# Patient Record
Sex: FEMALE | Race: WHITE | Employment: OTHER | URBAN - METROPOLITAN AREA
[De-identification: names, ages, dates, MRNs, and addresses within clinical notes are randomized per-mention and may not be internally consistent; named-entity substitution may affect disease eponyms.]

---

## 2017-11-15 ENCOUNTER — OFFICE VISIT (OUTPATIENT)
Dept: SURGERY | Facility: CLINIC | Age: 72
End: 2017-11-15

## 2017-11-15 VITALS
OXYGEN SATURATION: 98 % | HEIGHT: 59 IN | SYSTOLIC BLOOD PRESSURE: 162 MMHG | WEIGHT: 127.9 LBS | DIASTOLIC BLOOD PRESSURE: 89 MMHG | BODY MASS INDEX: 25.78 KG/M2 | HEART RATE: 93 BPM | TEMPERATURE: 98.2 F

## 2017-11-15 DIAGNOSIS — K62.82 ANAL DYSPLASIA: Primary | ICD-10-CM

## 2017-11-15 ASSESSMENT — PAIN SCALES - GENERAL: PAINLEVEL: NO PAIN (0)

## 2017-11-15 NOTE — LETTER
11/15/2017       RE: Rakel Robert  [address] [address] [address]     Dear Colleague,    Thank you for referring your patient, Rakel Robert, to the [facility] COLON AND RECTAL SURGERY at [facility]. Please see a copy of my visit note below.      Colon and Rectal Surgery Clinic High Resolution Anoscopy Note    RE: Rakel Robert  : 1945  KOSTAS: 11/15/17    Rakel Robert is a 69 year old female with a history of vulvovaginal dysplasia, for which she is no longer having active follow up. She has had prior HRA with biopsies showing low-grade dysplasia. Her last HRA was on 5/20/15 with patchy mild to moderate acetowhitening with normal biopsies and normal anal cytology. She presents today for high resolution anoscopy.     HPI: Rakel has no current complaints.She had basal cell cancer on her nose and ear. She believes she is up to date on her colonoscopy but is unsure when the last was done. She plans to follow up with her PCP soon and will make sure she is not due.    ASSESSMENT: Written, informed consent was obtained prior to procedure. Anal cytology was obtained with Dacron swab. Digital anal rectal exam was performed with two small, movable lesions palpated in the right and left anal canal. Dilute acetic acid soak was completed for 2 minutes. Lubricant was used to insert the anoscope. Performed high resolution microscopy using the colposcope. The dentate line was viewed in its entirety. Abnormal areas noted were diffuse, patchy areas of bright acetowhitening with punctation throughout the anal canal. Two representative biopsies were taken in the left lateral and right lateral anal verge using a baby Tischler forceps after injection with 1.5% lidocaine with epinephrine. Hemostasis was obtained using Monsel solution.  The perianal area was inspected after acetic acid soak. The findings noted hyperpigmentation. No acetowhitening or punctation.    The patient tolerated the procedures well.    PLAN: Rakel Robert is a pleasant 69 year old female with a history of vulvovaginal dysplasia and AIN 1. If biopsies show low grade dysplasia, return for HRA in 6 months. If normal, return in 2 years. If high grade dysplasia, would consider topical Efudex or Aldara given how diffuse it is and would then have her return for HRA after 4 months.  Patient's questions were answered to her stated satisfaction and she is in agreement with this plan.    For details of past medical history, surgical history, family history, medications, allergies, and review of systems, please see details below.    Medical history:  Past Medical History:   Diagnosis Date     Anal dysplasia 2012     Basal cell carcinoma     right elbow     History of cholecystectomy      History of hysterectomy     12-15 years ago     Postmenopausal      Pregnancies, multiple          Sleep apnea        Surgical history:  Past Surgical History:   Procedure Laterality Date     HYSTERECTOMY TOTAL ABDOMINAL, BILATERAL SALPINGO-OOPHORECTOMY, COMBINED      for heavy bleeding     KNEE SURGERY      laparoscopic     LAPAROSCOPIC CHOLECYSTECTOMY       LASER CO2 EXCISE VULVA WIDE LOCAL  2011    Procedure:LASER CO2 EXCISE VULVA WIDE LOCAL; Wide Local Excision of Vulva with CO2 Laser of Vulva , OREIRECTAL BIOPSY, COLPOSCOPY. *Latex Allergy*; Surgeon:ZAHRA CANCINO; Location: OR       Family history:  Family History   Problem Relation Age of Onset     Asthma Sister      Breast Cancer Mother 60     CEREBROVASCULAR DISEASE Father      C.A.D. Mother      C.A.D. Father      DIABETES Mother      DIABETES Son      CANCER Son      testicular     Anesthesia Reaction No family hx of      Colon Polyps No family hx of      Crohn Disease No family hx of      Ulcerative Colitis No family hx of      Colon Cancer No family hx of      Other Cancer       Yes       Medications:  Current Outpatient  "Prescriptions   Medication Sig Dispense Refill     estradiol (ESTRACE) 0.5 MG tablet Take 0.5 mg by mouth daily.       mometasone (NASONEX) 50 MCG/ACT nasal spray 2 sprays by Both Nostrils route daily as needed.       Allergies:  The patientis allergic to adhesive tape; iodine-131; latex; shellfish allergy; sulfa drugs; and mold.    Social history:  Social History   Substance Use Topics     Smoking status: Never Smoker     Smokeless tobacco: Never Used     Alcohol use No     Marital status: .    Review of Systems:  Nursing Notes:   Eyal Arias LPN  11/15/2017  2:24 PM  Signed  Chief Complaint   Patient presents with     Clinic Care Coordination - Follow-up     Pt here today for HRA procedure.        Vitals:    11/15/17 1421   BP: 162/89   BP Location: Left arm   Patient Position: Chair   Cuff Size: Adult Regular   Pulse: 93   Temp: 98.2  F (36.8  C)   TempSrc: Oral   SpO2: 98%   Weight: 127 lb 14.4 oz   Height: 4' 11\"       Body mass index is 25.83 kg/(m^2).  Hollie POP LPN       This procedure was performed under a collaborative agreement with Dr. Rober Barbosa MD, Chief of Colon and Rectal Surgery, [facility].      Again, thank you for allowing me to participate in the care of your patient.      Sincerely,    VIC Leslie CNP      "

## 2017-11-15 NOTE — MR AVS SNAPSHOT
"              After Visit Summary   11/15/2017    Rakel Robert    MRN: [id number]           Patient Information     Date Of Birth          1945        Visit Information        Provider Department      11/15/2017 2:00 PM Tamika Taylor APRN [facility] Colon and Rectal Surgery        Today's Diagnoses     Anal dysplasia    -  1       Follow-ups after your visit        Who to contact     Please call your clinic at [phone] to:    Ask questions about your health    Make or cancel appointments    Discuss your medicines    Learn about your test results    Speak to your doctor   If you have compliments or concerns about an experience at your clinic, or if you wish to file a complaint, please contact [facility] Patient Relations at [phone] or email us at [email or web address]         Additional Information About Your Visit        MyChart Information     [employer] is an electronic gateway that provides easy, online access to your medical records. With [employer], you can request a clinic appointment, read your test results, renew a prescription or communicate with your care team.     To sign up for [employer] visit the website at [email or web address]   You will be asked to enter the access code listed below, as well as some personal information. Please follow the directions to create your username and password.     Your access code is: [email or web address]  Expires: 2018  5:30 AM     Your access code will  in 90 days. If you need help or a new code, please contact your [facility] or call [phone] for assistance.        Care EveryWhere ID     This is your Care EveryWhere ID. This could be used by other organizations to access your [place] medical records  [id number]        Your Vitals Were     Pulse Temperature Height Pulse Oximetry BMI (Body Mass Index)       93 98.2  F (36.8  C) (Oral) 4' 11\" 98% 25.83 kg/m2  "       Blood Pressure from Last 3 Encounters:   11/15/17 162/89   05/20/15 143/80   03/31/14 150/72    Weight from Last 3 Encounters:   11/15/17 127 lb 14.4 oz   05/20/15 121 lb 12.8 oz   03/31/14 121 lb 6.4 oz              We Performed the Following     Cytology non gyn (Anal PAP)     HC ANOSCOPY DX, HRA W/ BIOPSY(IES)     Surgical pathology exam        Primary Care Provider Office Phone # Fax #    Niki BELL [facility] [phone]       [facility] [address]   [facility] [address]        Equal Access to Services     CORNELIA LÓPEZ : Hadii nathan Barajas, wadanielda priti, remi kaalmada jamaal, radha scott. So [facility] [phone].    ATENCIÓN: Si habla español, tiene a joseph disposición servicios gratuitos de asistencia lingüística. [facility] [phone].    We comply with applicable federal civil rights laws and [place] laws. We do not discriminate on the basis of race, color, national origin, age, disability, sex, sexual orientation, or gender identity.            Thank you!     Thank you for choosing [facility] COLON AND RECTAL SURGERY  for your care. Our goal is always to provide you with excellent care. Hearing back from our patients is one way we can continue to improve our services. Please take a few minutes to complete the written survey that you may receive in the mail after your visit with us. Thank you!             Your Updated Medication List - Protect others around you: Learn how to safely use, store and throw away your medicines at www.disposemymeds.org.          This list is accurate as of: 11/15/17  3:00 PM.  Always use your most recent med list.                   Brand Name Dispense Instructions for use Diagnosis    estradiol 0.5 MG tablet    ESTRACE     Take 0.5 mg by mouth daily.        NASONEX 50 MCG/ACT spray   Generic drug:  mometasone      2 sprays by Both Nostrils route daily as needed.

## 2017-11-15 NOTE — PROGRESS NOTES
Colon and Rectal Surgery Clinic High Resolution Anoscopy Note    RE: Rakel Robert  : 1945  KOSTAS: 11/15/17    Rakel Robert is a 69 year old female with a history of vulvovaginal dysplasia, for which she is no longer having active follow up. She has had prior HRA with biopsies showing low-grade dysplasia. Her last HRA was on 5/20/15 with patchy mild to moderate acetowhitening with normal biopsies and normal anal cytology. She presents today for high resolution anoscopy.     HPI: Rakel has no current complaints.She had basal cell cancer on her nose and ear. She believes she is up to date on her colonoscopy but is unsure when the last was done. She plans to follow up with her PCP soon and will make sure she is not due.    ASSESSMENT: Written, informed consent was obtained prior to procedure. Anal cytology was obtained with Dacron swab. Digital anal rectal exam was performed with two small, movable lesions palpated in the right and left anal canal. Dilute acetic acid soak was completed for 2 minutes. Lubricant was used to insert the anoscope. Performed high resolution microscopy using the colposcope. The dentate line was viewed in its entirety. Abnormal areas noted were diffuse, patchy areas of bright acetowhitening with punctation throughout the anal canal. Two representative biopsies were taken in the left lateral and right lateral anal verge using a baby Tischler forceps after injection with 1.5% lidocaine with epinephrine. Hemostasis was obtained using Monsel solution.  The perianal area was inspected after acetic acid soak. The findings noted hyperpigmentation. No acetowhitening or punctation.   The patient tolerated the procedures well.    PLAN: Rakel Robert is a pleasant 69 year old female with a history of vulvovaginal dysplasia and AIN 1. If biopsies show low grade dysplasia, return for HRA in 6 months. If normal, return in 2 years. If high grade dysplasia, would consider topical  Efudex or Aldara given how diffuse it is and would then have her return for HRA after 4 months.  Patient's questions were answered to her stated satisfaction and she is in agreement with this plan.    For details of past medical history, surgical history, family history, medications, allergies, and review of systems, please see details below.    Medical history:  Past Medical History:   Diagnosis Date     Anal dysplasia 2012     Basal cell carcinoma     right elbow     History of cholecystectomy 2006     History of hysterectomy     12-15 years ago     Postmenopausal      Pregnancies, multiple          Sleep apnea        Surgical history:  Past Surgical History:   Procedure Laterality Date     HYSTERECTOMY TOTAL ABDOMINAL, BILATERAL SALPINGO-OOPHORECTOMY, COMBINED      for heavy bleeding     KNEE SURGERY      laparoscopic     LAPAROSCOPIC CHOLECYSTECTOMY       LASER CO2 EXCISE VULVA WIDE LOCAL  2011    Procedure:LASER CO2 EXCISE VULVA WIDE LOCAL; Wide Local Excision of Vulva with CO2 Laser of Vulva , OREIRECTAL BIOPSY, COLPOSCOPY. *Latex Allergy*; Surgeon:ZAHRA CANCINO; Location: OR       Family history:  Family History   Problem Relation Age of Onset     Asthma Sister      Breast Cancer Mother 60     CEREBROVASCULAR DISEASE Father      C.A.D. Mother      C.A.D. Father      DIABETES Mother      DIABETES Son      CANCER Son      testicular     Anesthesia Reaction No family hx of      Colon Polyps No family hx of      Crohn Disease No family hx of      Ulcerative Colitis No family hx of      Colon Cancer No family hx of      Other Cancer       Yes       Medications:  Current Outpatient Prescriptions   Medication Sig Dispense Refill     estradiol (ESTRACE) 0.5 MG tablet Take 0.5 mg by mouth daily.       mometasone (NASONEX) 50 MCG/ACT nasal spray 2 sprays by Both Nostrils route daily as needed.       Allergies:  The patientis allergic to adhesive tape; iodine-131; latex; shellfish allergy;  "sulfa drugs; and mold.    Social history:  Social History   Substance Use Topics     Smoking status: Never Smoker     Smokeless tobacco: Never Used     Alcohol use No     Marital status: .    Review of Systems:  Nursing Notes:   Eyal Arias LPN  11/15/2017  2:24 PM  Signed  Chief Complaint   Patient presents with     Clinic Care Coordination - Follow-up     Pt here today for HRA procedure.        Vitals:    11/15/17 1421   BP: 162/89   BP Location: Left arm   Patient Position: Chair   Cuff Size: Adult Regular   Pulse: 93   Temp: 98.2  F (36.8  C)   TempSrc: Oral   SpO2: 98%   Weight: 127 lb 14.4 oz   Height: 4' 11\"       Body mass index is 25.83 kg/(m^2).  Hollie POP LPN                           This procedure was performed under a collaborative agreement with Dr. Rober Barbosa MD, Chief of Colon and Rectal Surgery, [facility].    Tamika Schmidt, NP-C  Colon and Rectal Surgery  [facility]        "

## 2017-11-15 NOTE — NURSING NOTE
"Chief Complaint   Patient presents with     Clinic Care Coordination - Follow-up     Pt here today for HRA procedure.        Vitals:    11/15/17 1421   BP: 162/89   BP Location: Left arm   Patient Position: Chair   Cuff Size: Adult Regular   Pulse: 93   Temp: 98.2  F (36.8  C)   TempSrc: Oral   SpO2: 98%   Weight: 127 lb 14.4 oz   Height: 4' 11\"       Body mass index is 25.83 kg/(m^2).  Hollie POP LPN                        "

## 2017-11-16 ENCOUNTER — TELEPHONE (OUTPATIENT)
Dept: SURGERY | Facility: CLINIC | Age: 72
End: 2017-11-16

## 2017-11-16 LAB — COPATH REPORT: NORMAL

## 2017-11-16 NOTE — TELEPHONE ENCOUNTER
Called to discuss biopsy results showing low-grade dysplasia. Would recommend repeat high-resolution anoscopy in clinic in 6 months. Left a message.    VIC Miles, NP-C  Colon and Rectal Surgery  [facility]

## 2017-11-17 LAB — COPATH REPORT: NORMAL

## 2018-05-17 ENCOUNTER — TELEPHONE (OUTPATIENT)
Dept: SURGERY | Facility: CLINIC | Age: 73
End: 2018-05-17

## 2018-05-17 NOTE — TELEPHONE ENCOUNTER
Left VM for pt to return my call to schedule follow-up (anal microscopy) appt w/ Tamika Schmidt (Colorectal).  Brianna [phone]

## 2018-06-27 ENCOUNTER — TELEPHONE (OUTPATIENT)
Dept: SURGERY | Facility: CLINIC | Age: 73
End: 2018-06-27

## 2018-06-28 ENCOUNTER — OFFICE VISIT (OUTPATIENT)
Dept: SURGERY | Facility: CLINIC | Age: 73
End: 2018-06-28
Payer: COMMERCIAL

## 2018-06-28 VITALS
WEIGHT: 128.6 LBS | BODY MASS INDEX: 25.92 KG/M2 | OXYGEN SATURATION: 99 % | DIASTOLIC BLOOD PRESSURE: 76 MMHG | TEMPERATURE: 98.5 F | HEIGHT: 59 IN | HEART RATE: 85 BPM | SYSTOLIC BLOOD PRESSURE: 158 MMHG

## 2018-06-28 DIAGNOSIS — K62.82 ANAL DYSPLASIA: Primary | ICD-10-CM

## 2018-06-28 ASSESSMENT — PAIN SCALES - GENERAL: PAINLEVEL: NO PAIN (0)

## 2018-06-28 NOTE — LETTER
2018       RE: Rakel Robert  [address] [address] [facility] [address]     Dear Colleague,    Thank you for referring your patient, Rakel Robert, to the [facility] COLON AND RECTAL SURGERY at [facility]. Please see a copy of my visit note below.      Colon and Rectal Surgery Clinic High Resolution Anoscopy Note    RE: Rakel Robert  : 1945  KOSTAS: 2018    Rakel oRbert is a 69 year old female with a history of vulvovaginal dysplasia, for which she is no longer having active follow up. She has had prior HRA with biopsies showing low-grade dysplasia. Her last HRA was on 11/15/17 with normal anal cytology and biopsies showing low grade dysplasia. She presents today for high resolution anoscopy.     HPI: Rakel has no current complaints.She had basal cell cancer on her nose and ear and has had several laser procedures for this recently. She states that she is up to date on her colonoscopy.    ASSESSMENT:   Prior to the start of the procedure and with procedural staff participation, I verbally confirmed the patient s identity using two indicators, relevant allergies, that the procedure was appropriate and matched the consent or emergent situation, and that the correct equipment/implants were available. Immediately prior to starting the procedure I conducted the Time Out with the procedural staff and re-confirmed the patient s name, procedure, and site/side. (The Joint Commission universal protocol was followed.)  Yes    Sedation (Moderate or Deep): None    Anal cytology was not obtained today. Digital anal rectal exam was performed with two small, movable lesions palpated in the right and left anal canal. Dilute acetic acid soak was completed for 2 minutes. Lubricant was used to insert the anoscope. Performed high resolution microscopy using the colposcope. The dentate line was viewed in its entirety. Abnormal areas noted were diffuse, patchy areas  of bright acetowhitening with punctation throughout the anal canal. One representative biopsy was taken in the right posterior position using a baby Tischler forceps after injection with 1% lidocaine with epinephrine. Hemostasis was obtained using Monsel solution.  The perianal area was inspected after acetic acid soak. Some continued areas of acetowhitening without punctation extending to the right and left perianal position. No biopsies taken.   The patient tolerated the procedures well.    PLAN: Will follow up with biopsy results. If biopsies show low grade dysplasia or are normal, return for HRA in 6 months.  If high grade dysplasia, would advised examination under anesthesia with repeat high resolution anoscopy with fulguration.  Patient's questions were answered to her stated satisfaction and she is in agreement with this plan.    For details of past medical history, surgical history, family history, medications, allergies, and review of systems, please see details below.    Medical history:  Past Medical History:   Diagnosis Date     Anal dysplasia 2012     Basal cell carcinoma     right elbow     History of cholecystectomy 2006     History of hysterectomy     12-15 years ago     Postmenopausal      Pregnancies, multiple          Sleep apnea        Surgical history:  Past Surgical History:   Procedure Laterality Date     HYSTERECTOMY TOTAL ABDOMINAL, BILATERAL SALPINGO-OOPHORECTOMY, COMBINED      for heavy bleeding     KNEE SURGERY      laparoscopic     LAPAROSCOPIC CHOLECYSTECTOMY       LASER CO2 EXCISE VULVA WIDE LOCAL  2011    Procedure:LASER CO2 EXCISE VULVA WIDE LOCAL; Wide Local Excision of Vulva with CO2 Laser of Vulva , OREIRECTAL BIOPSY, COLPOSCOPY. *Latex Allergy*; Surgeon:ZAHRA CANCINO; Location: OR       Family history:  Family History   Problem Relation Age of Onset     Asthma Sister      Breast Cancer Mother 60     Cerebrovascular Disease Father      C.A.D. Mother       DMITRIY Father      Diabetes Mother      Diabetes Son      Cancer Son      testicular     Anesthesia Reaction No family hx of      Colon Polyps No family hx of      Crohn Disease No family hx of      Ulcerative Colitis No family hx of      Colon Cancer No family hx of      Other Cancer       Yes       Medications:  Current Outpatient Prescriptions   Medication Sig Dispense Refill     estradiol (ESTRACE) 0.5 MG tablet Take 0.5 mg by mouth daily.       mometasone (NASONEX) 50 MCG/ACT nasal spray 2 sprays by Both Nostrils route daily as needed.       Allergies:  The patientis allergic to adhesive tape; iodine-131; latex; shellfish allergy; sulfa drugs; and mold.    Social history:  Social History   Substance Use Topics     Smoking status: Never Smoker     Smokeless tobacco: Never Used     Alcohol use No     Marital status: .    Review of Systems:  There are no exam notes on file for this visit.     This procedure was performed under a collaborative agreement with Dr. Rober Barbosa MD, Chief of Colon and Rectal Surgery, [facility].    Tamika Schmidt NP-C  Colon and Rectal Surgery  [facility]

## 2018-06-28 NOTE — NURSING NOTE
The following medication was given:     MEDICATION:  Lidocaine with epinephrine  ROUTE: SQ  SITE: Right Posterior anal canal  DOSE: 1% epi per 30 mL  LOT #: -EV  : Jesse  EXPIRATION DATE: 7/1/2019  NDC#: 3674-7787-21   Was there drug waste? Yes  Amount of drug waste (mL): 25.  Reason for waste:  Single use vial      MICHELLE Estrella  June 28, 2018

## 2018-06-28 NOTE — PROGRESS NOTES
Colon and Rectal Surgery Clinic High Resolution Anoscopy Note    RE: Rakel Robert  : 1945  KOSTAS: 2018    Rakel Robert is a 69 year old female with a history of vulvovaginal dysplasia, for which she is no longer having active follow up. She has had prior HRA with biopsies showing low-grade dysplasia. Her last HRA was on 11/15/17 with normal anal cytology and biopsies showing low grade dysplasia. She presents today for high resolution anoscopy.     HPI: Rakel has no current complaints.She had basal cell cancer on her nose and ear and has had several laser procedures for this recently. She states that she is up to date on her colonoscopy.    ASSESSMENT:   Prior to the start of the procedure and with procedural staff participation, I verbally confirmed the patient s identity using two indicators, relevant allergies, that the procedure was appropriate and matched the consent or emergent situation, and that the correct equipment/implants were available. Immediately prior to starting the procedure I conducted the Time Out with the procedural staff and re-confirmed the patient s name, procedure, and site/side. (The Joint Commission universal protocol was followed.)  Yes    Sedation (Moderate or Deep): None    Anal cytology was not obtained today. Digital anal rectal exam was performed with two small, movable lesions palpated in the right and left anal canal. Dilute acetic acid soak was completed for 2 minutes. Lubricant was used to insert the anoscope. Performed high resolution microscopy using the colposcope. The dentate line was viewed in its entirety. Abnormal areas noted were diffuse, patchy areas of bright acetowhitening with punctation throughout the anal canal. One representative biopsy was taken in the right posterior position using a baby Tischler forceps after injection with 1% lidocaine with epinephrine. Hemostasis was obtained using Monsel solution.  The perianal area was inspected  after acetic acid soak. Some continued areas of acetowhitening without punctation extending to the right and left perianal position. No biopsies taken.   The patient tolerated the procedures well.    PLAN: Will follow up with biopsy results. If biopsies show low grade dysplasia or are normal, return for HRA in 6 months.  If high grade dysplasia, would advised examination under anesthesia with repeat high resolution anoscopy with fulguration.  Patient's questions were answered to her stated satisfaction and she is in agreement with this plan.    For details of past medical history, surgical history, family history, medications, allergies, and review of systems, please see details below.    Medical history:  Past Medical History:   Diagnosis Date     Anal dysplasia 2012     Basal cell carcinoma     right elbow     History of cholecystectomy      History of hysterectomy     12-15 years ago     Postmenopausal      Pregnancies, multiple          Sleep apnea        Surgical history:  Past Surgical History:   Procedure Laterality Date     HYSTERECTOMY TOTAL ABDOMINAL, BILATERAL SALPINGO-OOPHORECTOMY, COMBINED      for heavy bleeding     KNEE SURGERY      laparoscopic     LAPAROSCOPIC CHOLECYSTECTOMY       LASER CO2 EXCISE VULVA WIDE LOCAL  2011    Procedure:LASER CO2 EXCISE VULVA WIDE LOCAL; Wide Local Excision of Vulva with CO2 Laser of Vulva , OREIRECTAL BIOPSY, COLPOSCOPY. *Latex Allergy*; Surgeon:ZAHRA CANCINO; Location:[facility]       Family history:  Family History   Problem Relation Age of Onset     Asthma Sister      Breast Cancer Mother 60     Cerebrovascular Disease Father      C.A.D. Mother      C.A.D. Father      Diabetes Mother      Diabetes Son      Cancer Son      testicular     Anesthesia Reaction No family hx of      Colon Polyps No family hx of      Crohn Disease No family hx of      Ulcerative Colitis No family hx of      Colon Cancer No family hx of      Other Cancer       Yes        Medications:  Current Outpatient Prescriptions   Medication Sig Dispense Refill     estradiol (ESTRACE) 0.5 MG tablet Take 0.5 mg by mouth daily.       mometasone (NASONEX) 50 MCG/ACT nasal spray 2 sprays by Both Nostrils route daily as needed.       Allergies:  The patientis allergic to adhesive tape; iodine-131; latex; shellfish allergy; sulfa drugs; and mold.    Social history:  Social History   Substance Use Topics     Smoking status: Never Smoker     Smokeless tobacco: Never Used     Alcohol use No     Marital status: .    Review of Systems:  There are no exam notes on file for this visit.     This procedure was performed under a collaborative agreement with Dr. Rober Barbosa MD, Chief of Colon and Rectal Surgery, [facility].    Tamika Schmidt NP-C  Colon and Rectal Surgery  [facility]

## 2018-06-28 NOTE — NURSING NOTE
"Chief Complaint   Patient presents with     RECHECK     HRA- anal microscopy clinic       Vitals:    06/28/18 1147   BP: 158/76   BP Location: Left arm   Patient Position: Sitting   Cuff Size: Adult Regular   Pulse: 85   Temp: 98.5  F (36.9  C)   TempSrc: Oral   SpO2: 99%   Weight: 128 lb 9.6 oz   Height: 4' 11\"       Body mass index is 25.97 kg/(m^2).      Isac Steen, EMT                      "

## 2018-06-28 NOTE — MR AVS SNAPSHOT
"              After Visit Summary   2018    Rakel Robert    MRN: [id number]           Patient Information     Date Of Birth          1945        Visit Information        Provider Department      2018 12:00 PM Tamika Taylor APRN [facility] Colon and Rectal Surgery        Today's Diagnoses     Anal dysplasia    -  1       Follow-ups after your visit        Who to contact     Please call your clinic at [phone] to:    Ask questions about your health    Make or cancel appointments    Discuss your medicines    Learn about your test results    Speak to your doctor            Additional Information About Your Visit        MyChart Information     Dat[employer] is an electronic gateway that provides easy, online access to your medical records. With Dat[employer], you can request a clinic appointment, read your test results, renew a prescription or communicate with your care team.     To sign up for [employer] visit the website at [email or web address]   You will be asked to enter the access code listed below, as well as some personal information. Please follow the directions to create your username and password.     Your access code is: [email or web address]  Expires: 2018  6:30 AM     Your access code will  in 90 days. If you need help or a new code, please contact your [facility] or call [phone] for assistance.        Care EveryWhere ID     This is your Care EveryWhere ID. This could be used by other organizations to access your [place] medical records  [id number]        Your Vitals Were     Pulse Temperature Height Pulse Oximetry BMI (Body Mass Index)       85 98.5  F (36.9  C) (Oral) 4' 11\" 99% 25.97 kg/m2        Blood Pressure from Last 3 Encounters:   18 158/76   11/15/17 162/89   05/20/15 143/80    Weight from Last 3 Encounters:   18 128 lb 9.6 oz   11/15/17 127 lb 14.4 oz   05/20/15 121 lb 12.8 oz              We Performed the " Following     HC ANOSCOPY DX, HRA W/ BIOPSY(IES)     Surgical pathology exam        Primary Care Provider Office Phone # Fax #    Niki BELL Elco [phone]       [facility] [address]   [facility] [address]        Equal Access to Services     DAVID LÓPEZ : Hadii aad ku hadasho Soomaali, waaxda luqadaha, qaybta kaalmada adeegyada, waxay rohanin hayoliven mouna scott. So [facility] [phone].    ATENCIÓN: Si habla español, tiene a joseph disposición servicios gratuitos de asistencia lingüística. [facility] [phone].    We comply with applicable federal civil rights laws and [place] laws. We do not discriminate on the basis of race, color, national origin, age, disability, sex, sexual orientation, or gender identity.            Thank you!     Thank you for choosing [facility] COLON AND RECTAL SURGERY  for your care. Our goal is always to provide you with excellent care. Hearing back from our patients is one way we can continue to improve our services. Please take a few minutes to complete the written survey that you may receive in the mail after your visit with us. Thank you!             Your Updated Medication List - Protect others around you: Learn how to safely use, store and throw away your medicines at www.disposemymeds.org.      Notice  As of 6/28/2018  1:34 PM    You have not been prescribed any medications.

## 2018-06-29 ENCOUNTER — TELEPHONE (OUTPATIENT)
Dept: SURGERY | Facility: CLINIC | Age: 73
End: 2018-06-29

## 2018-06-29 LAB — COPATH REPORT: NORMAL

## 2018-06-29 NOTE — TELEPHONE ENCOUNTER
Call to discuss biopsy results showing low-grade dysplasia.  Would recommend repeat high-resolution anoscopy in 6 months.  Left a message.     VIC Miles, NP-C  Colon and Rectal Surgery  [facility]

## 2018-12-13 ENCOUNTER — OFFICE VISIT (OUTPATIENT)
Dept: SURGERY | Facility: CLINIC | Age: 73
End: 2018-12-13
Payer: COMMERCIAL

## 2018-12-13 VITALS — TEMPERATURE: 98 F | OXYGEN SATURATION: 98 % | BODY MASS INDEX: 25.92 KG/M2 | WEIGHT: 128.6 LBS | HEIGHT: 59 IN

## 2018-12-13 DIAGNOSIS — K62.82 ANAL DYSPLASIA: Primary | ICD-10-CM

## 2018-12-13 ASSESSMENT — PAIN SCALES - GENERAL: PAINLEVEL: NO PAIN (0)

## 2018-12-13 ASSESSMENT — MIFFLIN-ST. JEOR: SCORE: 1003.96

## 2018-12-13 NOTE — LETTER
2018       RE: Raekl Robert  [address] [facility] [address]     Dear Colleague,    Thank you for referring your patient, Rakel Robert, to the [facility] COLON AND RECTAL SURGERY at [facility]. Please see a copy of my visit note below.      Colon and Rectal Surgery Clinic High Resolution Anoscopy Note    RE: Rakel Robert  : 1945  KOSTAS: 2018    Rakel Robert is a 69 year old female with a history of vulvovaginal dysplasia, for which she is no longer having active follow up. She has had prior HRA with biopsies showing low-grade dysplasia. Her last HRA was on 18 biopsies showing low grade dysplasia. Last anal cytology on 11/15/17 was normal. She presents today for high resolution anoscopy.     HPI: Rakel has no current complaints. She states that she is up to date on her colonoscopy.    ASSESSMENT:   Prior to the start of the procedure and with procedural staff participation, I verbally confirmed the patient s identity using two indicators, relevant allergies, that the procedure was appropriate and matched the consent or emergent situation, and that the correct equipment/implants were available. Immediately prior to starting the procedure I conducted the Time Out with the procedural staff and re-confirmed the patient s name, procedure, and site/side. (The Joint Commission universal protocol was followed.)  Yes    Sedation (Moderate or Deep): None    Anal cytology was obtained today using a Dacron swab. Digital anal rectal exam was performed with two small, movable lesions palpated in the right and left anal canal. Dilute acetic acid soak was completed for 2 minutes. Lubricant was used to insert the anoscope. Performed high resolution microscopy using the colposcope. The dentate line was viewed in its entirety. Abnormal areas noted were diffuse, patchy areas of acetowhitening with fine punctation throughout the distal anal  canal.  Perianal skin inspected after acetic acid soak. Some hyperpigmentation consistent with HPV effect in the anterior position. No acetowhitening or punctation.  No biopsies taken.   The patient tolerated the procedures well.    PLAN: Return for HRA in 6 months.   Patient's questions were answered to her stated satisfaction and she is in agreement with this plan.    For details of past medical history, surgical history, family history, medications, allergies, and review of systems, please see details below.    Medical history:  Past Medical History:   Diagnosis Date     Anal dysplasia 2012     Basal cell carcinoma     right elbow     History of cholecystectomy      History of hysterectomy     12-15 years ago     Postmenopausal      Pregnancies, multiple          Sleep apnea        Surgical history:  Past Surgical History:   Procedure Laterality Date     HYSTERECTOMY TOTAL ABDOMINAL, BILATERAL SALPINGO-OOPHORECTOMY, COMBINED      for heavy bleeding     KNEE SURGERY      laparoscopic     LAPAROSCOPIC CHOLECYSTECTOMY       LASER CO2 EXCISE VULVA WIDE LOCAL  2011    Procedure:LASER CO2 EXCISE VULVA WIDE LOCAL; Wide Local Excision of Vulva with CO2 Laser of Vulva , OREIRECTAL BIOPSY, COLPOSCOPY. *Latex Allergy*; Surgeon:ZAHRA CANCINO; Location: OR       Family history:  Family History   Problem Relation Age of Onset     Asthma Sister      Breast Cancer Mother 60     Cerebrovascular Disease Father      C.A.D. Mother      C.A.D. Father      Diabetes Mother      Diabetes Son      Cancer Son         testicular     Anesthesia Reaction No family hx of      Colon Polyps No family hx of      Crohn's Disease No family hx of      Ulcerative Colitis No family hx of      Colon Cancer No family hx of      Other Cancer Unknown         Yes       Medications:  No current outpatient medications on file.     Allergies:  The patientis allergic to adhesive tape; iodine-131; latex; shellfish allergy;  sulfa drugs; and mold.    Social history:  Social History     Tobacco Use     Smoking status: Never Smoker     Smokeless tobacco: Never Used   Substance Use Topics     Alcohol use: No     Marital status: .    Review of Systems:  There are no exam notes on file for this visit.     This procedure was performed under a collaborative agreement with Dr. Rober Barbosa MD, Chief of Colon and Rectal Surgery, [facility].    Tamika Schmidt NP-C  Colon and Rectal Surgery  [facility]

## 2018-12-13 NOTE — PROGRESS NOTES
Colon and Rectal Surgery Clinic High Resolution Anoscopy Note    RE: Rakel Robert  : 1945  KOSTAS: 2018    Rakel Robert is a 69 year old female with a history of vulvovaginal dysplasia, for which she is no longer having active follow up. She has had prior HRA with biopsies showing low-grade dysplasia. Her last HRA was on 18 biopsies showing low grade dysplasia. Last anal cytology on 11/15/17 was normal. She presents today for high resolution anoscopy.     HPI: Rakel has no current complaints. She states that she is up to date on her colonoscopy.    ASSESSMENT:   Prior to the start of the procedure and with procedural staff participation, I verbally confirmed the patient s identity using two indicators, relevant allergies, that the procedure was appropriate and matched the consent or emergent situation, and that the correct equipment/implants were available. Immediately prior to starting the procedure I conducted the Time Out with the procedural staff and re-confirmed the patient s name, procedure, and site/side. (The Joint Commission universal protocol was followed.)  Yes    Sedation (Moderate or Deep): None    Anal cytology was obtained today using a Dacron swab. Digital anal rectal exam was performed with two small, movable lesions palpated in the right and left anal canal. Dilute acetic acid soak was completed for 2 minutes. Lubricant was used to insert the anoscope. Performed high resolution microscopy using the colposcope. The dentate line was viewed in its entirety. Abnormal areas noted were diffuse, patchy areas of acetowhitening with fine punctation throughout the distal anal canal.  Perianal skin inspected after acetic acid soak. Some hyperpigmentation consistent with HPV effect in the anterior position. No acetowhitening or punctation.  No biopsies taken.   The patient tolerated the procedures well.    PLAN: Return for HRA in 6 months.   Patient's questions were answered to  her stated satisfaction and she is in agreement with this plan.    For details of past medical history, surgical history, family history, medications, allergies, and review of systems, please see details below.    Medical history:  Past Medical History:   Diagnosis Date     Anal dysplasia 2012     Basal cell carcinoma     right elbow     History of cholecystectomy      History of hysterectomy     12-15 years ago     Postmenopausal      Pregnancies, multiple          Sleep apnea        Surgical history:  Past Surgical History:   Procedure Laterality Date     HYSTERECTOMY TOTAL ABDOMINAL, BILATERAL SALPINGO-OOPHORECTOMY, COMBINED      for heavy bleeding     KNEE SURGERY      laparoscopic     LAPAROSCOPIC CHOLECYSTECTOMY       LASER CO2 EXCISE VULVA WIDE LOCAL  2011    Procedure:LASER CO2 EXCISE VULVA WIDE LOCAL; Wide Local Excision of Vulva with CO2 Laser of Vulva , OREIRECTAL BIOPSY, COLPOSCOPY. *Latex Allergy*; Surgeon:ZAHRA CANCINO; Location: OR       Family history:  Family History   Problem Relation Age of Onset     Asthma Sister      Breast Cancer Mother 60     Cerebrovascular Disease Father      C.A.D. Mother      C.A.D. Father      Diabetes Mother      Diabetes Son      Cancer Son         testicular     Anesthesia Reaction No family hx of      Colon Polyps No family hx of      Crohn's Disease No family hx of      Ulcerative Colitis No family hx of      Colon Cancer No family hx of      Other Cancer Unknown         Yes       Medications:  No current outpatient medications on file.     Allergies:  The patientis allergic to adhesive tape; iodine-131; latex; shellfish allergy; sulfa drugs; and mold.    Social history:  Social History     Tobacco Use     Smoking status: Never Smoker     Smokeless tobacco: Never Used   Substance Use Topics     Alcohol use: No     Marital status: .    Review of Systems:  There are no exam notes on file for this visit.     This procedure was  performed under a collaborative agreement with Dr. Rober Barbosa MD, Chief of Colon and Rectal Surgery, [facility].    Tamika Schmidt NP-C  Colon and Rectal Surgery  [facility]

## 2018-12-13 NOTE — NURSING NOTE
"Chief Complaint   Patient presents with     High resolution anoscopy       Vitals:    12/13/18 1106   Temp: 98  F (36.7  C)   TempSrc: Oral   SpO2: 98%   Weight: 128 lb 9.6 oz   Height: 4' 11\"       Body mass index is 25.97 kg/m .      Isac Steen, EMT                      "

## 2018-12-14 LAB — COPATH REPORT: NORMAL

## 2018-12-31 ENCOUNTER — NURSE TRIAGE (OUTPATIENT)
Dept: NURSING | Facility: CLINIC | Age: 73
End: 2018-12-31

## 2018-12-31 NOTE — TELEPHONE ENCOUNTER
Patient unable to open up lab results. This RN wasn't able to either. It refers to a link which I couldn't find so I connected with the page  to have them transfer the caller to the [facility] clinic for them to help her.  Linette Pinto RN-[facility]

## 2019-07-25 ENCOUNTER — OFFICE VISIT (OUTPATIENT)
Dept: SURGERY | Facility: CLINIC | Age: 74
End: 2019-07-25
Payer: COMMERCIAL

## 2019-07-25 VITALS
OXYGEN SATURATION: 100 % | HEIGHT: 59 IN | BODY MASS INDEX: 26.47 KG/M2 | WEIGHT: 131.3 LBS | HEART RATE: 80 BPM | SYSTOLIC BLOOD PRESSURE: 140 MMHG | DIASTOLIC BLOOD PRESSURE: 78 MMHG

## 2019-07-25 DIAGNOSIS — K62.82 ANAL DYSPLASIA: Primary | ICD-10-CM

## 2019-07-25 ASSESSMENT — PAIN SCALES - GENERAL: PAINLEVEL: NO PAIN (0)

## 2019-07-25 ASSESSMENT — MIFFLIN-ST. JEOR: SCORE: 1016.2

## 2019-07-25 NOTE — NURSING NOTE
"Chief Complaint   Patient presents with     High Resolution Anoscopy       Vitals:    07/25/19 1149   BP: 140/78   BP Location: Left arm   Patient Position: Sitting   Cuff Size: Adult Regular   Pulse: 80   SpO2: 100%   Weight: 131 lb 4.8 oz   Height: 4' 11\"       Body mass index is 26.52 kg/m .      Isac Steen, EMT                      "

## 2019-07-25 NOTE — LETTER
2019       RE: Rakel Robert  [address] [address] [facility] [address]     Dear Colleague,    Thank you for referring your patient, Rakel Robert, to the [facility] COLON AND RECTAL SURGERY at [facility]. Please see a copy of my visit note below.    Colon and Rectal Surgery Clinic High Resolution Anoscopy Note    RE: Rakel Robert  : 1945  KOSTAS: 19    Rakel Robert is a 69 year old female with a history of vulvovaginal dysplasia, for which she is no longer having active follow up. She has had prior HRA with biopsies showing low-grade dysplasia. Her last HRA was on 18 with no evidence of high grade dysplasia. Last anal cytology on 18 was normal. She presents today for high resolution anoscopy.     HPI: Rakel has no current complaints. She states that she is up to date on her colonoscopy.    ASSESSMENT:   Prior to the start of the procedure and with procedural staff participation, I verbally confirmed the patient s identity using two indicators, relevant allergies, that the procedure was appropriate and matched the consent or emergent situation, and that the correct equipment/implants were available. Immediately prior to starting the procedure I conducted the Time Out with the procedural staff and re-confirmed the patient s name, procedure, and site/side. (The Joint Commission universal protocol was followed.)  Yes    Sedation (Moderate or Deep): None    Anal cytology was not obtained today. Digital anal rectal exam was performed with two small, movable lesions palpated in the right and left anal canal. Dilute acetic acid soak was completed for 2 minutes. Lubricant was used to insert the anoscope. Performed high resolution microscopy using the colposcope. The dentate line was viewed in its entirety. Abnormal areas noted were diffuse, patchy areas of acetowhitening with fine punctation throughout the distal anal canal.  Perianal skin  inspected after acetic acid soak. Some hyperpigmentation consistent with HPV effect in the anterior position and mild acetowhitening without punctation in the left lateral position.  Small nevus on left buttock with irregular boarder and dark center.  No biopsies taken.   The patient tolerated the procedures well.    PLAN: Return for HRA in one year. Offered biopsy of irregular mole but she would prefer to return to dermatology as she just had a whole body check a few weeks ago but her buttocks were not looked at. Patient's questions were answered to her stated satisfaction and she is in agreement with this plan.    For details of past medical history, surgical history, family history, medications, allergies, and review of systems, please see details below.    Medical history:  Past Medical History:   Diagnosis Date     Anal dysplasia 2012     Basal cell carcinoma     right elbow     History of cholecystectomy      History of hysterectomy     12-15 years ago     Postmenopausal      Pregnancies, multiple          Sleep apnea        Surgical history:  Past Surgical History:   Procedure Laterality Date     HYSTERECTOMY TOTAL ABDOMINAL, BILATERAL SALPINGO-OOPHORECTOMY, COMBINED      for heavy bleeding     KNEE SURGERY      laparoscopic     LAPAROSCOPIC CHOLECYSTECTOMY       LASER CO2 EXCISE VULVA WIDE LOCAL  2011    Procedure:LASER CO2 EXCISE VULVA WIDE LOCAL; Wide Local Excision of Vulva with CO2 Laser of Vulva , OREIRECTAL BIOPSY, COLPOSCOPY. *Latex Allergy*; Surgeon:ZAHRA CANCINO; Location: OR       Family history:  Family History   Problem Relation Age of Onset     Asthma Sister      Breast Cancer Mother 60     Cerebrovascular Disease Father      C.A.D. Mother      C.A.D. Father      Diabetes Mother      Diabetes Son      Cancer Son         testicular     Anesthesia Reaction No family hx of      Colon Polyps No family hx of      Crohn's Disease No family hx of      Ulcerative Colitis  No family hx of      Colon Cancer No family hx of      Other Cancer Unknown         Yes       Medications:  No current outpatient medications on file.     Allergies:  The patientis allergic to adhesive tape; iodine-131; latex; shellfish allergy; sulfa drugs; and mold.    Social history:  Social History     Tobacco Use     Smoking status: Never Smoker     Smokeless tobacco: Never Used   Substance Use Topics     Alcohol use: No     Marital status: .    Review of Systems:  There are no exam notes on file for this visit.     This procedure was performed under a collaborative agreement with Dr. Rober Barbosa MD, Chief of Colon and Rectal Surgery, [facility].    Tamika cShmidt NP-C  Colon and Rectal Surgery  [facility]

## 2019-07-25 NOTE — PROGRESS NOTES
Colon and Rectal Surgery Clinic High Resolution Anoscopy Note    RE: Rakel Robert  : 1945  KOSTAS: 19    Rakel Robert is a 69 year old female with a history of vulvovaginal dysplasia, for which she is no longer having active follow up. She has had prior HRA with biopsies showing low-grade dysplasia. Her last HRA was on 18 with no evidence of high grade dysplasia. Last anal cytology on 18 was normal. She presents today for high resolution anoscopy.     HPI: Rakel has no current complaints. She states that she is up to date on her colonoscopy.    ASSESSMENT:   Prior to the start of the procedure and with procedural staff participation, I verbally confirmed the patient s identity using two indicators, relevant allergies, that the procedure was appropriate and matched the consent or emergent situation, and that the correct equipment/implants were available. Immediately prior to starting the procedure I conducted the Time Out with the procedural staff and re-confirmed the patient s name, procedure, and site/side. (The Joint Commission universal protocol was followed.)  Yes    Sedation (Moderate or Deep): None    Anal cytology was not obtained today. Digital anal rectal exam was performed with two small, movable lesions palpated in the right and left anal canal. Dilute acetic acid soak was completed for 2 minutes. Lubricant was used to insert the anoscope. Performed high resolution microscopy using the colposcope. The dentate line was viewed in its entirety. Abnormal areas noted were diffuse, patchy areas of acetowhitening with fine punctation throughout the distal anal canal.  Perianal skin inspected after acetic acid soak. Some hyperpigmentation consistent with HPV effect in the anterior position and mild acetowhitening without punctation in the left lateral position.  Small nevus on left buttock with irregular boarder and dark center.  No biopsies taken.   The patient tolerated the  procedures well.    PLAN: Return for HRA in one year. Offered biopsy of irregular mole but she would prefer to return to dermatology as she just had a whole body check a few weeks ago but her buttocks were not looked at. Patient's questions were answered to her stated satisfaction and she is in agreement with this plan.    For details of past medical history, surgical history, family history, medications, allergies, and review of systems, please see details below.    Medical history:  Past Medical History:   Diagnosis Date     Anal dysplasia 2012     Basal cell carcinoma     right elbow     History of cholecystectomy 2006     History of hysterectomy     12-15 years ago     Postmenopausal      Pregnancies, multiple          Sleep apnea        Surgical history:  Past Surgical History:   Procedure Laterality Date     HYSTERECTOMY TOTAL ABDOMINAL, BILATERAL SALPINGO-OOPHORECTOMY, COMBINED      for heavy bleeding     KNEE SURGERY      laparoscopic     LAPAROSCOPIC CHOLECYSTECTOMY       LASER CO2 EXCISE VULVA WIDE LOCAL  2011    Procedure:LASER CO2 EXCISE VULVA WIDE LOCAL; Wide Local Excision of Vulva with CO2 Laser of Vulva , OREIRECTAL BIOPSY, COLPOSCOPY. *Latex Allergy*; Surgeon:ZAHRA CANCINO; Location: OR       Family history:  Family History   Problem Relation Age of Onset     Asthma Sister      Breast Cancer Mother 60     Cerebrovascular Disease Father      C.A.D. Mother      C.A.D. Father      Diabetes Mother      Diabetes Son      Cancer Son         testicular     Anesthesia Reaction No family hx of      Colon Polyps No family hx of      Crohn's Disease No family hx of      Ulcerative Colitis No family hx of      Colon Cancer No family hx of      Other Cancer Unknown         Yes       Medications:  No current outpatient medications on file.     Allergies:  The patientis allergic to adhesive tape; iodine-131; latex; shellfish allergy; sulfa drugs; and mold.    Social history:  Social  History     Tobacco Use     Smoking status: Never Smoker     Smokeless tobacco: Never Used   Substance Use Topics     Alcohol use: No     Marital status: .    Review of Systems:  There are no exam notes on file for this visit.     This procedure was performed under a collaborative agreement with Dr. Rober Barbosa MD, Chief of Colon and Rectal Surgery, [facility].    Tamika Schmidt NP-C  Colon and Rectal Surgery  [facility]

## 2020-01-09 ENCOUNTER — TRANSFERRED RECORDS (OUTPATIENT)
Dept: HEALTH INFORMATION MANAGEMENT | Facility: CLINIC | Age: 75
End: 2020-01-09

## 2020-01-14 ENCOUNTER — TELEPHONE (OUTPATIENT)
Dept: OPHTHALMOLOGY | Facility: CLINIC | Age: 75
End: 2020-01-14

## 2020-01-14 NOTE — TELEPHONE ENCOUNTER
Letter faxed to referring provider to inform them of patient's scheduled appointment date & time.    Melanie Jeans, OA

## 2020-01-29 ENCOUNTER — OFFICE VISIT (OUTPATIENT)
Dept: OPHTHALMOLOGY | Facility: CLINIC | Age: 75
End: 2020-01-29
Payer: COMMERCIAL

## 2020-01-29 DIAGNOSIS — H02.9 EYELID LESION: Primary | ICD-10-CM

## 2020-01-29 DIAGNOSIS — Z85.828 HISTORY OF BASAL CELL CANCER: ICD-10-CM

## 2020-01-29 DIAGNOSIS — H00.15 CHALAZION OF LEFT LOWER EYELID: ICD-10-CM

## 2020-01-29 PROCEDURE — 99203 OFFICE O/P NEW LOW 30 MIN: CPT | Performed by: OPHTHALMOLOGY

## 2020-01-29 PROCEDURE — 92285 EXTERNAL OCULAR PHOTOGRAPHY: CPT | Performed by: OPHTHALMOLOGY

## 2020-01-29 ASSESSMENT — SLIT LAMP EXAM - LIDS: COMMENTS: NORMAL

## 2020-01-29 ASSESSMENT — CONF VISUAL FIELD
METHOD: COUNTING FINGERS
OD_NORMAL: 1
OS_NORMAL: 1

## 2020-01-29 ASSESSMENT — VISUAL ACUITY
OS_CC: 20/20
METHOD: SNELLEN - LINEAR
OD_CC: 20/20
CORRECTION_TYPE: GLASSES

## 2020-01-29 ASSESSMENT — TONOMETRY
OS_IOP_MMHG: 20
IOP_METHOD: ICARE
OD_IOP_MMHG: 24

## 2020-01-29 NOTE — NURSING NOTE
Chief Complaints and History of Present Illnesses   Patient presents with     Eyelid Cyst Evaluation       Chief Complaint(s) and History of Present Illness(es)     Eyelid Cyst Evaluation     Laterality: left lower lid              Comments     Patient being seen today at the request of Dr. Kirk Galan from [facility]. Left lower lid bump, along inside of lid towards nasal corner. Has tried warm compresses but did not notice any improvement. Bump has been there since the beginning of December. History of Mohs procedure, 3 facial surgeries all together. Has also had cancerous spot removed from elbow and buttox, all basal cell.                 Solange Williamson, COA

## 2020-01-29 NOTE — LETTER
2020         RE:  :  MRN: Rakel Robert  1945  [id number]     Dear Dr. Galan,    Thank you for asking me to see your patient, Rakel Robert, for an oculoplastic   consultation.  My assessment and plan are below.  For further details, please see my attached clinic note.      Chief Complaint(s) and History of Present Illness(es)     Eyelid Cyst Evaluation     Laterality: left lower lid              Comments     Patient being seen today at the request of Dr. Kirk Galan from [facility]. Left lower lid bump, along inside of lid towards   nasal corner. Has tried warm compresses but did not notice any   improvement. Bump has been there since the beginning of December. History   of Mohs procedure, 3 facial surgeries all together. Has also had cancerous   spot removed from elbow and buttox, all basal cell.       All previous skin cancers (3) basal cells.     Current lesion present for at least 1 month.   Assessment & Plan     Rakel Robert is a 74 year old female with the following diagnoses:   1. Eyelid lesion    2. Chalazion of left lower eyelid    3. History of basal cell cancer         Most likely chalazion, no concerning features of Basal cell carcinoma at this time. She is asymptomatic of it, but worried because of her history. Discussed options.    Plan: Photographs and re-evaluate in 2 months. If no changes or smaller can observe.           Again, thank you for allowing me to participate in the care of your patient.      Sincerely,    Topher Abrams MD  Department of Ophthalmology and Visual Neurosciences  [facility]    CC: Kirk Galan, [facility]  [address]  [facility] [address]  VIA Facsimile: [phone]

## 2020-01-29 NOTE — PROGRESS NOTES
Chief Complaint(s) and History of Present Illness(es)     Eyelid Cyst Evaluation     Laterality: left lower lid              Comments     Patient being seen today at the request of Dr. Kirk Galan from [facility]. Left lower lid bump, along inside of lid towards   nasal corner. Has tried warm compresses but did not notice any   improvement. Bump has been there since the beginning of December. History   of Mohs procedure, 3 facial surgeries all together. Has also had cancerous   spot removed from elbow and buttox, all basal cell.       All previous skin cancers (3) basal cells.     Current lesion present for at least 1 month.   Assessment & Plan     Rakel Robert is a 74 year old female with the following diagnoses:   1. Eyelid lesion    2. Chalazion of left lower eyelid    3. History of basal cell cancer         Most likely chalazion, no concerning features of Basal cell carcinoma at this time. She is asymptomatic of it, but worried because of her history. Discussed options.    Plan: Photographs and re-evaluate in 2 months. If no changes or smaller can observe.            Attending Physician Attestation:  Complete documentation of historical and exam elements from today's encounter can be found in the full encounter summary report (not reduplicated in this progress note).  I personally obtained the chief complaint(s) and history of present illness.  I confirmed and edited as necessary the review of systems, past medical/surgical history, family history, social history, and examination findings as documented by others; and I examined the patient myself.  I personally reviewed the relevant tests, images, and reports as documented above.  I formulated and edited as necessary the assessment and plan and discussed the findings and management plan with the patient and family. - Topher Abrams MD

## 2020-01-30 ENCOUNTER — TELEPHONE (OUTPATIENT)
Dept: OPHTHALMOLOGY | Facility: CLINIC | Age: 75
End: 2020-01-30

## 2020-01-30 NOTE — TELEPHONE ENCOUNTER
1/30 Explained we need to reschedule appointment on 4/8/20 with Dr. Obando. Provided phone number [phone] to reschedule appointment.      Geri Parsons          Procedure    Ortho/Sports Med/Ent/Eye   [facility]   [phone]

## 2020-02-24 ENCOUNTER — HEALTH MAINTENANCE LETTER (OUTPATIENT)
Age: 75
End: 2020-02-24

## 2020-10-08 ENCOUNTER — OFFICE VISIT (OUTPATIENT)
Dept: SURGERY | Facility: CLINIC | Age: 75
End: 2020-10-08
Payer: COMMERCIAL

## 2020-10-08 VITALS
WEIGHT: 128.1 LBS | OXYGEN SATURATION: 100 % | HEIGHT: 58 IN | SYSTOLIC BLOOD PRESSURE: 162 MMHG | TEMPERATURE: 98.3 F | DIASTOLIC BLOOD PRESSURE: 85 MMHG | HEART RATE: 93 BPM | BODY MASS INDEX: 26.89 KG/M2

## 2020-10-08 DIAGNOSIS — K62.82 ANAL DYSPLASIA: Primary | ICD-10-CM

## 2020-10-08 PROCEDURE — 88305 TISSUE EXAM BY PATHOLOGIST: CPT | Performed by: PATHOLOGY

## 2020-10-08 PROCEDURE — 46607 DIAGNOSTIC ANOSCOPY & BIOPSY: CPT | Performed by: NURSE PRACTITIONER

## 2020-10-08 PROCEDURE — 88112 CYTOPATH CELL ENHANCE TECH: CPT | Performed by: PATHOLOGY

## 2020-10-08 RX ORDER — LIDOCAINE HYDROCHLORIDE AND EPINEPHRINE 10; 10 MG/ML; UG/ML
3 INJECTION, SOLUTION INFILTRATION; PERINEURAL ONCE
Status: ACTIVE | OUTPATIENT
Start: 2020-10-08

## 2020-10-08 ASSESSMENT — MIFFLIN-ST. JEOR: SCORE: 975.81

## 2020-10-08 NOTE — NURSING NOTE
The following medication was given:     MEDICATION: Lidocaine with Epinephrine 1%, 1:100,000  ROUTE: SQ  SITE: Anal canal  DOSE: 3 mL of 200mg per 20 mL  LOT #: [id number]  :  [employer]  EXPIRATION DATE:  4/22  ND: [id number]    MICHELLE Gunter  Colon and Rectal Surgery

## 2020-10-08 NOTE — LETTER
10/8/2020       RE: Rakel Robert  [address] [address] [facility] [id number]     Dear Colleague,    Thank you for referring your patient, Rakel Robert, to the [facility] COLON AND RECTAL SURGERY CLINIC [place] at [facility]. Please see a copy of my visit note below.    Colon and Rectal Surgery Clinic High Resolution Anoscopy Note    RE: Rakel Robert  : 1945  KOSTAS: 10/8/2020    Rakel Robert is a 75 year old female with a history of vulvovaginal dysplasia, for which she is no longer having active follow up. She has had prior HRA with biopsies showing low-grade dysplasia. Her last HRA was on 19 with no evidence of high grade dysplasia. Last anal cytology on 18 was normal. She presents today for high resolution anoscopy.     HPI: Rakel has no current complaints. She states that she is up to date on her colonoscopy. Rakel kindly brought our staff hand knitted mask headbands.    ASSESSMENT:   Prior to the start of the procedure and with procedural staff participation, I verbally confirmed the patient s identity using two indicators, relevant allergies, that the procedure was appropriate and matched the consent or emergent situation, and that the correct equipment/implants were available. Immediately prior to starting the procedure I conducted the Time Out with the procedural staff and re-confirmed the patient s name, procedure, and site/side. (The Joint Commission universal protocol was followed.)  Yes    Sedation (Moderate or Deep): None    Anal cytology was obtained today using a Dacron swab. Digital anal rectal exam was performed with two small, movable lesions palpated in the right and left anal canal. Dilute acetic acid soak was completed for 2 minutes. Lubricant was used to insert the anoscope. Performed high resolution microscopy using the colposcope. The dentate line was viewed in its entirety. Abnormal areas noted were  several patchy areas of bright acetowhitening with punctation throughout the distal anal canal. Biopsy was obtained in the posterior position at one of these sites using a baby Tischler forceps and hemostasis was obtained using Monsel solution.   Perianal skin inspected after acetic acid soak. Some hyperpigmentation consistent with HPV effect in the anterior position and mild acetowhitening without punctation in the left lateral position.  Small nevus on left buttock with irregular boarder and dark center.  No biopsies taken.   The patient tolerated the procedures well.    PLAN: Will follow up with results of biopsy from today. If low grade dysplasia or normal, follow up in one year. If high grade dysplasia would recommend repeat high resolution anoscopy in the OR with fulguration. Offered biopsy of irregular mole but she would prefer to return to dermatology and is scheduled for a full body check in a few weeks. Patient's questions were answered to her stated satisfaction and she is in agreement with this plan.    For details of past medical history, surgical history, family history, medications, allergies, and review of systems, please see details below.    Medical history:  Past Medical History:   Diagnosis Date     Anal dysplasia 2012     Basal cell carcinoma     right elbow     History of cholecystectomy 2006     History of hysterectomy     12-15 years ago     Postmenopausal      Pregnancies, multiple          Sleep apnea      Surgical history:  Past Surgical History:   Procedure Laterality Date     CATARACT IOL, RT/LT Bilateral 3/7/1996 OD, 2001 OS     CHALAZION EXCISION  2014     H EYE PHAKIC IOL/ICL WITH YAG LASER IRIDOTOMY, BILATERAL Bilateral 1997 rt eye, 3/27/2003 lt eye     HYSTERECTOMY TOTAL ABDOMINAL, BILATERAL SALPINGO-OOPHORECTOMY, COMBINED      for heavy bleeding     KNEE SURGERY  2004    laparoscopic     LAPAROSCOPIC CHOLECYSTECTOMY  2006     LASER CO2 EXCISE VULVA WIDE  "LOCAL  11/17/2011    Procedure:LASER CO2 EXCISE VULVA WIDE LOCAL; Wide Local Excision of Vulva with CO2 Laser of Vulva , OREIRECTAL BIOPSY, COLPOSCOPY. *Latex Allergy*; Surgeon:ZAHRA CANCINO; Location: OR     Family history:  Family History   Problem Relation Age of Onset     Asthma Sister      Breast Cancer Mother 60     C.A.D. Mother      Diabetes Mother      Cerebrovascular Disease Father      C.A.D. Father      Diabetes Son      Cancer Son         testicular     Other Cancer Other         Yes     Anesthesia Reaction No family hx of      Colon Polyps No family hx of      Crohn's Disease No family hx of      Ulcerative Colitis No family hx of      Colon Cancer No family hx of      Medications:  No current outpatient medications on file.     Allergies:  The patientis allergic to adhesive tape; iodine-131; latex; shellfish allergy; sulfa drugs; and mold.    Social history:  Social History     Tobacco Use     Smoking status: Never Smoker     Smokeless tobacco: Never Used   Substance Use Topics     Alcohol use: No     Marital status: .    Review of Systems:  Nursing Notes:   Isac Steen EMT  10/8/2020 12:22 PM  Signed  Chief Complaint   Patient presents with     Procedure     High Resolution Anoscopy     Vitals:    10/08/20 1202   BP: (!) 162/85   BP Location: Left arm   Patient Position: Sitting   Cuff Size: Adult Regular   Pulse: 93   Temp: 98.3  F (36.8  C)   TempSrc: Oral   SpO2: 100%   Weight: 128 lb 1.6 oz   Height: 4' 10\"     Body mass index is 26.77 kg/m .    MICHELLE Gunter    This procedure was performed under a collaborative agreement with Dr. Rober Barbosa MD, Chief of Colon and Rectal Surgery, [facility].    Again, thank you for allowing me to participate in the care of your patient.  Sincerely,    Tamika Schmidt NP-C  Colon and Rectal Surgery  [facility]  "

## 2020-10-08 NOTE — LETTER
10/8/2020       RE: Rakel Robert  [address] [address] [facility] [id number]     Dear Colleague,    Thank you for referring your patient, Rakel Robert, to the [facility] COLON AND RECTAL SURGERY CLINIC [place] at [facility]. Please see a copy of my visit note below.      Colon and Rectal Surgery Clinic High Resolution Anoscopy Note    RE: Rakel Robert  : 1945  KOSTAS: 10/8/2020    Rakel Robert is a 75 year old female with a history of vulvovaginal dysplasia, for which she is no longer having active follow up. She has had prior HRA with biopsies showing low-grade dysplasia. Her last HRA was on 19 with no evidence of high grade dysplasia. Last anal cytology on 18 was normal. She presents today for high resolution anoscopy.     HPI: Rakel has no current complaints. She states that she is up to date on her colonoscopy. Rakel kindly brought our staff hand knitted mask headbands.    ASSESSMENT:   Prior to the start of the procedure and with procedural staff participation, I verbally confirmed the patient s identity using two indicators, relevant allergies, that the procedure was appropriate and matched the consent or emergent situation, and that the correct equipment/implants were available. Immediately prior to starting the procedure I conducted the Time Out with the procedural staff and re-confirmed the patient s name, procedure, and site/side. (The Joint Commission universal protocol was followed.)  Yes    Sedation (Moderate or Deep): None    Anal cytology was obtained today using a Dacron swab. Digital anal rectal exam was performed with two small, movable lesions palpated in the right and left anal canal. Dilute acetic acid soak was completed for 2 minutes. Lubricant was used to insert the anoscope. Performed high resolution microscopy using the colposcope. The dentate line was viewed in its entirety. Abnormal areas noted were  several patchy areas of bright acetowhitening with punctation throughout the distal anal canal. Biopsy was obtained in the posterior position at one of these sites using a baby Tischler forceps and hemostasis was obtained using Monsel solution.   Perianal skin inspected after acetic acid soak. Some hyperpigmentation consistent with HPV effect in the anterior position and mild acetowhitening without punctation in the left lateral position.  Small nevus on left buttock with irregular boarder and dark center.  No biopsies taken.   The patient tolerated the procedures well.    PLAN: Will follow up with results of biopsy from today. If low grade dysplasia or normal, follow up in one year. If high grade dysplasia would recommend repeat high resolution anoscopy in the OR with fulguration. Offered biopsy of irregular mole but she would prefer to return to dermatology and is scheduled for a full body check in a few weeks. Patient's questions were answered to her stated satisfaction and she is in agreement with this plan.    For details of past medical history, surgical history, family history, medications, allergies, and review of systems, please see details below.    Medical history:  Past Medical History:   Diagnosis Date     Anal dysplasia 2012     Basal cell carcinoma     right elbow     History of cholecystectomy 2006     History of hysterectomy     12-15 years ago     Postmenopausal      Pregnancies, multiple          Sleep apnea        Surgical history:  Past Surgical History:   Procedure Laterality Date     CATARACT IOL, RT/LT Bilateral 3/7/1996 OD, 2001 OS     CHALAZION EXCISION  2014     H EYE PHAKIC IOL/ICL WITH YAG LASER IRIDOTOMY, BILATERAL Bilateral 1997 rt eye, 3/27/2003 lt eye     HYSTERECTOMY TOTAL ABDOMINAL, BILATERAL SALPINGO-OOPHORECTOMY, COMBINED      for heavy bleeding     KNEE SURGERY  2004    laparoscopic     LAPAROSCOPIC CHOLECYSTECTOMY  2006     LASER CO2 EXCISE VULVA WIDE  "LOCAL  11/17/2011    Procedure:LASER CO2 EXCISE VULVA WIDE LOCAL; Wide Local Excision of Vulva with CO2 Laser of Vulva , OREIRECTAL BIOPSY, COLPOSCOPY. *Latex Allergy*; Surgeon:ZAHRA CANCINO; Location: OR       Family history:  Family History   Problem Relation Age of Onset     Asthma Sister      Breast Cancer Mother 60     C.A.D. Mother      Diabetes Mother      Cerebrovascular Disease Father      C.A.D. Father      Diabetes Son      Cancer Son         testicular     Other Cancer Other         Yes     Anesthesia Reaction No family hx of      Colon Polyps No family hx of      Crohn's Disease No family hx of      Ulcerative Colitis No family hx of      Colon Cancer No family hx of        Medications:  No current outpatient medications on file.     Allergies:  The patientis allergic to adhesive tape; iodine-131; latex; shellfish allergy; sulfa drugs; and mold.    Social history:  Social History     Tobacco Use     Smoking status: Never Smoker     Smokeless tobacco: Never Used   Substance Use Topics     Alcohol use: No     Marital status: .    Review of Systems:  Nursing Notes:   Isac Steen EMT  10/8/2020 12:22 PM  Signed  Chief Complaint   Patient presents with     Procedure     High Resolution Anoscopy       Vitals:    10/08/20 1202   BP: (!) 162/85   BP Location: Left arm   Patient Position: Sitting   Cuff Size: Adult Regular   Pulse: 93   Temp: 98.3  F (36.8  C)   TempSrc: Oral   SpO2: 100%   Weight: 128 lb 1.6 oz   Height: 4' 10\"       Body mass index is 26.77 kg/m .      MICHELLE Gunter                           This procedure was performed under a collaborative agreement with Dr. Rober Barbosa MD, Chief of Colon and Rectal Surgery, [facility].    Tamika Schmidt, NP-C  Colon and Rectal Surgery  [facility]      Again, thank you for allowing me to participate in the care of your patient.      Sincerely,    Tamika Schmidt, " APRN CNP

## 2020-10-08 NOTE — PROGRESS NOTES
Colon and Rectal Surgery Clinic High Resolution Anoscopy Note    RE: Rakel Robert  : 1945  KOSTAS: 10/8/2020    Rakel Robert is a 75 year old female with a history of vulvovaginal dysplasia, for which she is no longer having active follow up. She has had prior HRA with biopsies showing low-grade dysplasia. Her last HRA was on 19 with no evidence of high grade dysplasia. Last anal cytology on 18 was normal. She presents today for high resolution anoscopy.     HPI: Rakel has no current complaints. She states that she is up to date on her colonoscopy. Rakel kindly brought our staff hand knitted mask headbands.    ASSESSMENT:   Prior to the start of the procedure and with procedural staff participation, I verbally confirmed the patient s identity using two indicators, relevant allergies, that the procedure was appropriate and matched the consent or emergent situation, and that the correct equipment/implants were available. Immediately prior to starting the procedure I conducted the Time Out with the procedural staff and re-confirmed the patient s name, procedure, and site/side. (The Joint Commission universal protocol was followed.)  Yes    Sedation (Moderate or Deep): None    Anal cytology was obtained today using a Dacron swab. Digital anal rectal exam was performed with two small, movable lesions palpated in the right and left anal canal. Dilute acetic acid soak was completed for 2 minutes. Lubricant was used to insert the anoscope. Performed high resolution microscopy using the colposcope. The dentate line was viewed in its entirety. Abnormal areas noted were several patchy areas of bright acetowhitening with punctation throughout the distal anal canal. Biopsy was obtained in the posterior position at one of these sites using a baby Tischler forceps and hemostasis was obtained using Monsel solution.   Perianal skin inspected after acetic acid soak. Some hyperpigmentation consistent  with HPV effect in the anterior position and mild acetowhitening without punctation in the left lateral position.  Small nevus on left buttock with irregular boarder and dark center.  No biopsies taken.   The patient tolerated the procedures well.    PLAN: Will follow up with results of biopsy from today. If low grade dysplasia or normal, follow up in one year. If high grade dysplasia would recommend repeat high resolution anoscopy in the OR with fulguration. Offered biopsy of irregular mole but she would prefer to return to dermatology and is scheduled for a full body check in a few weeks. Patient's questions were answered to her stated satisfaction and she is in agreement with this plan.    For details of past medical history, surgical history, family history, medications, allergies, and review of systems, please see details below.    Medical history:  Past Medical History:   Diagnosis Date     Anal dysplasia 2012     Basal cell carcinoma     right elbow     History of cholecystectomy 2006     History of hysterectomy     12-15 years ago     Postmenopausal      Pregnancies, multiple          Sleep apnea        Surgical history:  Past Surgical History:   Procedure Laterality Date     CATARACT IOL, RT/LT Bilateral 3/7/1996 OD, 2001 OS     CHALAZION EXCISION  2014     H EYE PHAKIC IOL/ICL WITH YAG LASER IRIDOTOMY, BILATERAL Bilateral 1997 rt eye, 3/27/2003 lt eye     HYSTERECTOMY TOTAL ABDOMINAL, BILATERAL SALPINGO-OOPHORECTOMY, COMBINED      for heavy bleeding     KNEE SURGERY  2004    laparoscopic     LAPAROSCOPIC CHOLECYSTECTOMY       LASER CO2 EXCISE VULVA WIDE LOCAL  2011    Procedure:LASER CO2 EXCISE VULVA WIDE LOCAL; Wide Local Excision of Vulva with CO2 Laser of Vulva , OREIRECTAL BIOPSY, COLPOSCOPY. *Latex Allergy*; Surgeon:ZAHRA CANCINO; Location: OR       Family history:  Family History   Problem Relation Age of Onset     Asthma Sister      Breast Cancer Mother 60  "    C.A.D. Mother      Diabetes Mother      Cerebrovascular Disease Father      C.A.D. Father      Diabetes Son      Cancer Son         testicular     Other Cancer Other         Yes     Anesthesia Reaction No family hx of      Colon Polyps No family hx of      Crohn's Disease No family hx of      Ulcerative Colitis No family hx of      Colon Cancer No family hx of        Medications:  No current outpatient medications on file.     Allergies:  The patientis allergic to adhesive tape; iodine-131; latex; shellfish allergy; sulfa drugs; and mold.    Social history:  Social History     Tobacco Use     Smoking status: Never Smoker     Smokeless tobacco: Never Used   Substance Use Topics     Alcohol use: No     Marital status: .    Review of Systems:  Nursing Notes:   Isac Steen EMT  10/8/2020 12:22 PM  Signed  Chief Complaint   Patient presents with     Procedure     High Resolution Anoscopy       Vitals:    10/08/20 1202   BP: (!) 162/85   BP Location: Left arm   Patient Position: Sitting   Cuff Size: Adult Regular   Pulse: 93   Temp: 98.3  F (36.8  C)   TempSrc: Oral   SpO2: 100%   Weight: 128 lb 1.6 oz   Height: 4' 10\"       Body mass index is 26.77 kg/m .      MICHELLE Gunter                           This procedure was performed under a collaborative agreement with Dr. Rober Barbosa MD, Chief of Colon and Rectal Surgery, [facility].    Tamika Schmidt NP-C  Colon and Rectal Surgery  [facility]  "

## 2020-10-08 NOTE — NURSING NOTE
"Chief Complaint   Patient presents with     Procedure     High Resolution Anoscopy       Vitals:    10/08/20 1202   BP: (!) 162/85   BP Location: Left arm   Patient Position: Sitting   Cuff Size: Adult Regular   Pulse: 93   Temp: 98.3  F (36.8  C)   TempSrc: Oral   SpO2: 100%   Weight: 128 lb 1.6 oz   Height: 4' 10\"       Body mass index is 26.77 kg/m .      Isac Lan, EMT                      "

## 2020-10-09 LAB — COPATH REPORT: NORMAL

## 2020-10-12 LAB — COPATH REPORT: NORMAL

## 2020-12-13 ENCOUNTER — HEALTH MAINTENANCE LETTER (OUTPATIENT)
Age: 75
End: 2020-12-13

## 2021-04-17 ENCOUNTER — HEALTH MAINTENANCE LETTER (OUTPATIENT)
Age: 76
End: 2021-04-17

## 2021-09-26 ENCOUNTER — HEALTH MAINTENANCE LETTER (OUTPATIENT)
Age: 76
End: 2021-09-26

## 2021-10-07 ENCOUNTER — OFFICE VISIT (OUTPATIENT)
Dept: SURGERY | Facility: CLINIC | Age: 76
End: 2021-10-07
Payer: COMMERCIAL

## 2021-10-07 VITALS
DIASTOLIC BLOOD PRESSURE: 64 MMHG | TEMPERATURE: 98.1 F | WEIGHT: 132 LBS | OXYGEN SATURATION: 99 % | SYSTOLIC BLOOD PRESSURE: 110 MMHG | HEIGHT: 58 IN | HEART RATE: 72 BPM | BODY MASS INDEX: 27.71 KG/M2

## 2021-10-07 DIAGNOSIS — K62.82 ANAL DYSPLASIA: Primary | ICD-10-CM

## 2021-10-07 LAB
LAB DIRECTOR COMMENTS: ABNORMAL
LAB DIRECTOR DISCLAIMER: ABNORMAL
LAB DIRECTOR INTERPRETATION: ABNORMAL
LAB DIRECTOR METHODOLOGY: ABNORMAL
LAB DIRECTOR RESULTS: ABNORMAL
SPECIMEN DESCRIPTION: ABNORMAL

## 2021-10-07 PROCEDURE — 88112 CYTOPATH CELL ENHANCE TECH: CPT | Mod: 26 | Performed by: PATHOLOGY

## 2021-10-07 PROCEDURE — 88305 TISSUE EXAM BY PATHOLOGIST: CPT | Mod: TC | Performed by: NURSE PRACTITIONER

## 2021-10-07 PROCEDURE — 88112 CYTOPATH CELL ENHANCE TECH: CPT | Mod: TC | Performed by: NURSE PRACTITIONER

## 2021-10-07 PROCEDURE — G0452 MOLECULAR PATHOLOGY INTERPR: HCPCS | Mod: 26 | Performed by: STUDENT IN AN ORGANIZED HEALTH CARE EDUCATION/TRAINING PROGRAM

## 2021-10-07 PROCEDURE — 87624 HPV HI-RISK TYP POOLED RSLT: CPT | Performed by: NURSE PRACTITIONER

## 2021-10-07 PROCEDURE — 88305 TISSUE EXAM BY PATHOLOGIST: CPT | Mod: 26 | Performed by: PATHOLOGY

## 2021-10-07 PROCEDURE — 46607 DIAGNOSTIC ANOSCOPY & BIOPSY: CPT | Performed by: NURSE PRACTITIONER

## 2021-10-07 RX ORDER — LIDOCAINE HYDROCHLORIDE AND EPINEPHRINE 10; 10 MG/ML; UG/ML
2 INJECTION, SOLUTION INFILTRATION; PERINEURAL ONCE
Status: COMPLETED | OUTPATIENT
Start: 2021-10-07 | End: 2021-10-07

## 2021-10-07 RX ADMIN — LIDOCAINE HYDROCHLORIDE AND EPINEPHRINE 2 ML: 10; 10 INJECTION, SOLUTION INFILTRATION; PERINEURAL at 12:02

## 2021-10-07 ASSESSMENT — MIFFLIN-ST. JEOR: SCORE: 988.5

## 2021-10-07 ASSESSMENT — PAIN SCALES - GENERAL: PAINLEVEL: NO PAIN (0)

## 2021-10-07 NOTE — LETTER
10/7/2021       RE: Rakel Robert  [id number] [facility] [id number]     Dear Colleague,    Thank you for referring your patient, Rakel Robert, to the [facility] [place] at [facility]. Please see a copy of my visit note below.      Colon and Rectal Surgery Clinic High Resolution Anoscopy Note    RE: Rakel Robert  : 1945  KOSTAS: 10/7/2021    Rakel Robert is a 76 year old female with a history of vulvovaginal dysplasia, for which she is no longer having active follow up. She has had prior HRA with biopsies showing low-grade dysplasia. Her last HRA was on 10/8/20 with biopsy showing AIN 1 and anal cytology at that time was inadequate.    HPI: Rakel has no current complaints. Her brother in law passed away less than 24 hours ago from cancer. She states that she is up to date on her colonoscopy.     ASSESSMENT:   Prior to the start of the procedure and with procedural staff participation, I verbally confirmed the patient s identity using two indicators, relevant allergies, that the procedure was appropriate and matched the consent or emergent situation, and that the correct equipment/implants were available. Immediately prior to starting the procedure I conducted the Time Out with the procedural staff and re-confirmed the patient s name, procedure, and site/side. (The Joint Commission universal protocol was followed.)  Yes    Sedation (Moderate or Deep): None    Anal cytology was obtained today using a Dacron swab. Digital anal rectal exam was performed with two small, movable lesions palpated in the right and left anal canal. Dilute acetic acid soak was completed for 2 minutes. Lubricant was used to insert the anoscope. Performed high resolution microscopy using the colposcope. The dentate line was viewed in its entirety. Abnormal areas noted were several patchy areas of bright acetowhitening  with punctation throughout the distal anal canal. Biopsy was obtained in the posterior and right lateral positions using a baby Tischler forceps and hemostasis was obtained using Monsel solution. A few hypertrophied anal papilla present.  Perianal skin inspected after acetic acid soak. Some hyperpigmentation consistent with HPV effect in the anterior position and mild acetowhitening without punctation in the left lateral position.  The patient tolerated the procedures well.    PLAN: Will follow up with results of biopsy from today. If low grade dysplasia or normal, follow up in one year. If high grade dysplasia would recommend repeat high resolution anoscopy in the OR with fulguration. Patient's questions were answered to her stated satisfaction and she is in agreement with this plan.    For details of past medical history, surgical history, family history, medications, allergies, and review of systems, please see details below.    Medical history:  Past Medical History:   Diagnosis Date     Anal dysplasia 2012     Basal cell carcinoma     right elbow     History of cholecystectomy 2006     History of hysterectomy     12-15 years ago     Postmenopausal      Pregnancies, multiple          Sleep apnea        Surgical history:  Past Surgical History:   Procedure Laterality Date     CATARACT IOL, RT/LT Bilateral 3/7/1996 OD, 2001 OS     CHALAZION EXCISION  2014     H EYE PHAKIC IOL/ICL WITH YAG LASER IRIDOTOMY, BILATERAL Bilateral 1997 rt eye, 3/27/2003 lt eye     HYSTERECTOMY TOTAL ABDOMINAL, BILATERAL SALPINGO-OOPHORECTOMY, COMBINED      for heavy bleeding     KNEE SURGERY      laparoscopic     LAPAROSCOPIC CHOLECYSTECTOMY       LASER CO2 EXCISE VULVA WIDE LOCAL  2011    Procedure:LASER CO2 EXCISE VULVA WIDE LOCAL; Wide Local Excision of Vulva with CO2 Laser of Vulva , OREIRECTAL BIOPSY, COLPOSCOPY. *Latex Allergy*; Surgeon:ZAHRA CANCINO; Location: OR       Family  history:  Family History   Problem Relation Age of Onset     Asthma Sister      Breast Cancer Mother 60     C.A.D. Mother      Diabetes Mother      Cerebrovascular Disease Father      C.A.D. Father      Diabetes Son      Cancer Son         testicular     Other Cancer Other         Yes     Anesthesia Reaction No family hx of      Colon Polyps No family hx of      Crohn's Disease No family hx of      Ulcerative Colitis No family hx of      Colon Cancer No family hx of        Medications:  No current outpatient medications on file.     Allergies:  The patientis allergic to adhesive tape, iodine-131, latex, shellfish allergy, sulfa drugs, and mold.    Social history:  Social History     Tobacco Use     Smoking status: Never Smoker     Smokeless tobacco: Never Used   Substance Use Topics     Alcohol use: No     Marital status: .    Review of Systems:  There are no exam notes on file for this visit.     This procedure was performed under a collaborative agreement with Dr. Rober Barbosa MD, Chief of Colon and Rectal Surgery, [facility].    Tamika Meyers NP-C  Colon and Rectal Surgery  [facility]      Again, thank you for allowing me to participate in the care of your patient.      Sincerely,    Tamika Meyers, VIC CNP

## 2021-10-07 NOTE — PROGRESS NOTES
Colon and Rectal Surgery Clinic High Resolution Anoscopy Note    RE: Rakel Robert  : 1945  KOSTAS: 10/7/2021    Rakel Robert is a 76 year old female with a history of vulvovaginal dysplasia, for which she is no longer having active follow up. She has had prior HRA with biopsies showing low-grade dysplasia. Her last HRA was on 10/8/20 with biopsy showing AIN 1 and anal cytology at that time was inadequate.    HPI: Rakel has no current complaints. Her brother in law passed away less than 24 hours ago from cancer. She states that she is up to date on her colonoscopy.     ASSESSMENT:   Prior to the start of the procedure and with procedural staff participation, I verbally confirmed the patient s identity using two indicators, relevant allergies, that the procedure was appropriate and matched the consent or emergent situation, and that the correct equipment/implants were available. Immediately prior to starting the procedure I conducted the Time Out with the procedural staff and re-confirmed the patient s name, procedure, and site/side. (The Joint Commission universal protocol was followed.)  Yes    Sedation (Moderate or Deep): None    Anal cytology was obtained today using a Dacron swab. Digital anal rectal exam was performed with two small, movable lesions palpated in the right and left anal canal. Dilute acetic acid soak was completed for 2 minutes. Lubricant was used to insert the anoscope. Performed high resolution microscopy using the colposcope. The dentate line was viewed in its entirety. Abnormal areas noted were several patchy areas of bright acetowhitening with punctation throughout the distal anal canal. Biopsy was obtained in the posterior and right lateral positions using a baby Tischler forceps and hemostasis was obtained using Monsel solution. A few hypertrophied anal papilla present.  Perianal skin inspected after acetic acid soak. Some hyperpigmentation consistent with HPV effect in  the anterior position and mild acetowhitening without punctation in the left lateral position.  The patient tolerated the procedures well.    PLAN: Will follow up with results of biopsy from today. If low grade dysplasia or normal, follow up in one year. If high grade dysplasia would recommend repeat high resolution anoscopy in the OR with fulguration. Patient's questions were answered to her stated satisfaction and she is in agreement with this plan.    For details of past medical history, surgical history, family history, medications, allergies, and review of systems, please see details below.    Medical history:  Past Medical History:   Diagnosis Date     Anal dysplasia 2012     Basal cell carcinoma     right elbow     History of cholecystectomy 2006     History of hysterectomy     12-15 years ago     Postmenopausal      Pregnancies, multiple          Sleep apnea        Surgical history:  Past Surgical History:   Procedure Laterality Date     CATARACT IOL, RT/LT Bilateral 3/7/1996 OD, 2001 OS     CHALAZION EXCISION  2014     H EYE PHAKIC IOL/ICL WITH YAG LASER IRIDOTOMY, BILATERAL Bilateral 1997 rt eye, 3/27/2003 lt eye     HYSTERECTOMY TOTAL ABDOMINAL, BILATERAL SALPINGO-OOPHORECTOMY, COMBINED      for heavy bleeding     KNEE SURGERY  2004    laparoscopic     LAPAROSCOPIC CHOLECYSTECTOMY  2006     LASER CO2 EXCISE VULVA WIDE LOCAL  2011    Procedure:LASER CO2 EXCISE VULVA WIDE LOCAL; Wide Local Excision of Vulva with CO2 Laser of Vulva , OREIRECTAL BIOPSY, COLPOSCOPY. *Latex Allergy*; Surgeon:ZAHRA CANCINO; Location: OR       Family history:  Family History   Problem Relation Age of Onset     Asthma Sister      Breast Cancer Mother 60     C.A.D. Mother      Diabetes Mother      Cerebrovascular Disease Father      C.A.D. Father      Diabetes Son      Cancer Son         testicular     Other Cancer Other         Yes     Anesthesia Reaction No family hx of      Colon Polyps No  family hx of      Crohn's Disease No family hx of      Ulcerative Colitis No family hx of      Colon Cancer No family hx of        Medications:  No current outpatient medications on file.     Allergies:  The patientis allergic to adhesive tape, iodine-131, latex, shellfish allergy, sulfa drugs, and mold.    Social history:  Social History     Tobacco Use     Smoking status: Never Smoker     Smokeless tobacco: Never Used   Substance Use Topics     Alcohol use: No     Marital status: .    Review of Systems:  There are no exam notes on file for this visit.     This procedure was performed under a collaborative agreement with Dr. Rober Barbosa MD, Chief of Colon and Rectal Surgery, [facility].    Tamika Schmidt NP-C  Colon and Rectal Surgery  [facility]

## 2021-10-07 NOTE — NURSING NOTE
"Chief Complaint   Patient presents with     Follow Up     One year high resolution anoscopy follow up       Vitals:    10/07/21 1116   BP: 110/64   BP Location: Left arm   Patient Position: Sitting   Cuff Size: Adult Regular   Pulse: 72   Temp: 98.1  F (36.7  C)   TempSrc: Oral   SpO2: 99%   Weight: 132 lb   Height: 4' 10\"       Body mass index is 27.59 kg/m .          Abril Arroyo CMA    "

## 2021-10-08 LAB
PATH REPORT.COMMENTS IMP SPEC: NORMAL
PATH REPORT.FINAL DX SPEC: NORMAL
PATH REPORT.FINAL DX SPEC: NORMAL
PATH REPORT.GROSS SPEC: NORMAL
PATH REPORT.GROSS SPEC: NORMAL
PATH REPORT.MICROSCOPIC SPEC OTHER STN: NORMAL
PATH REPORT.RELEVANT HX SPEC: NORMAL
PATH REPORT.RELEVANT HX SPEC: NORMAL
PHOTO IMAGE: NORMAL

## 2022-03-13 ENCOUNTER — HEALTH MAINTENANCE LETTER (OUTPATIENT)
Age: 77
End: 2022-03-13

## 2022-05-08 ENCOUNTER — HEALTH MAINTENANCE LETTER (OUTPATIENT)
Age: 77
End: 2022-05-08

## 2023-04-23 ENCOUNTER — HEALTH MAINTENANCE LETTER (OUTPATIENT)
Age: 78
End: 2023-04-23

## 2023-05-05 ENCOUNTER — OFFICE VISIT (OUTPATIENT)
Dept: SURGERY | Facility: CLINIC | Age: 78
End: 2023-05-05
Payer: COMMERCIAL

## 2023-05-05 VITALS — SYSTOLIC BLOOD PRESSURE: 150 MMHG | HEIGHT: 58 IN | DIASTOLIC BLOOD PRESSURE: 76 MMHG | BODY MASS INDEX: 27.59 KG/M2

## 2023-05-05 DIAGNOSIS — N90.1 VIN II (VULVAR INTRAEPITHELIAL NEOPLASIA II): ICD-10-CM

## 2023-05-05 DIAGNOSIS — K62.82 ANAL DYSPLASIA: Primary | ICD-10-CM

## 2023-05-05 LAB
LAB DIRECTOR COMMENTS: NORMAL
LAB DIRECTOR DISCLAIMER: NORMAL
LAB DIRECTOR INTERPRETATION: NORMAL
LAB DIRECTOR METHODOLOGY: NORMAL
LAB DIRECTOR RESULTS: NORMAL
SPECIMEN DESCRIPTION: NORMAL

## 2023-05-05 PROCEDURE — 88112 CYTOPATH CELL ENHANCE TECH: CPT | Mod: TC | Performed by: NURSE PRACTITIONER

## 2023-05-05 PROCEDURE — 87624 HPV HI-RISK TYP POOLED RSLT: CPT | Performed by: NURSE PRACTITIONER

## 2023-05-05 PROCEDURE — G0452 MOLECULAR PATHOLOGY INTERPR: HCPCS | Mod: 26 | Performed by: PATHOLOGY

## 2023-05-05 PROCEDURE — 99203 OFFICE O/P NEW LOW 30 MIN: CPT | Mod: 25 | Performed by: NURSE PRACTITIONER

## 2023-05-05 PROCEDURE — 88112 CYTOPATH CELL ENHANCE TECH: CPT | Mod: 26 | Performed by: PATHOLOGY

## 2023-05-05 PROCEDURE — 46601 DIAGNOSTIC ANOSCOPY: CPT | Performed by: NURSE PRACTITIONER

## 2023-05-05 RX ORDER — RISEDRONATE SODIUM 35 MG/1
TABLET, FILM COATED ORAL
COMMUNITY

## 2023-05-05 RX ORDER — LOSARTAN POTASSIUM 25 MG/1
25 TABLET ORAL DAILY
COMMUNITY

## 2023-05-05 RX ORDER — MOXIFLOXACIN 5 MG/ML
1 SOLUTION/ DROPS OPHTHALMIC 3 TIMES DAILY
COMMUNITY

## 2023-05-05 RX ORDER — LIDOCAINE HYDROCHLORIDE 20 MG/ML
JELLY TOPICAL ONCE
Status: COMPLETED | OUTPATIENT
Start: 2023-05-05 | End: 2023-05-05

## 2023-05-05 RX ADMIN — LIDOCAINE HYDROCHLORIDE 1 TUBE: 20 JELLY TOPICAL at 10:37

## 2023-05-05 ASSESSMENT — PAIN SCALES - GENERAL: PAINLEVEL: NO PAIN (0)

## 2023-05-05 NOTE — PROGRESS NOTES
Colon and Rectal Surgery Clinic High Resolution Anoscopy Note    RE: Rakel Robert  : 1945  KOSTAS: 23    Rakel Robert is a 76 year old female with a history of vulvovaginal dysplasia, for which she is no longer having active follow up. She has had prior HRA with biopsies showing low-grade dysplasia. Her last HRA was on 10/7/21 with biopsy showing AIN 1.    HPI: Rakel has no current complaints. She states that she is up to date on her colonoscopy.     ASSESSMENT:   Prior to the start of the procedure and with procedural staff participation, I verbally confirmed the patient s identity using two indicators, relevant allergies, that the procedure was appropriate and matched the consent or emergent situation, and that the correct equipment/implants were available. Immediately prior to starting the procedure I conducted the Time Out with the procedural staff and re-confirmed the patient s name, procedure, and site/side. (The Joint Commission universal protocol was followed.)  Yes    Sedation (Moderate or Deep): None    Anal cytology was obtained today using a Dacron swab. Digital anal rectal exam was performed with two small, movable lesions palpated in the right and left anal canal. Dilute acetic acid soak was completed for 2 minutes. Lubricant was used to insert the anoscope. Performed high resolution microscopy using the Proctostation. The dentate line was viewed in its entirety. No bright aceowhitening or punctation. Some internal hemorrhoids and some surface abrasions in the distal anal canal. Perianal area inspected after acetic acid soak with some hyperpigmentation but no bright acetowhitening or punctation.  The patient tolerated the procedures well.    PLAN: No evidence of high grade dysplasia on exam today. Repeat high resolution anoscopy in one year. Patient's questions were answered to her stated satisfaction and she is in agreement with this plan.     For details of past medical history,  surgical history, family history, medications, allergies, and review of systems, please see details below.    Medical history:  Past Medical History:   Diagnosis Date     Anal dysplasia 2012     Basal cell carcinoma     right elbow     History of cholecystectomy 2006     History of hysterectomy     12-15 years ago     Postmenopausal      Pregnancies, multiple          Sleep apnea        Surgical history:  Past Surgical History:   Procedure Laterality Date     CATARACT IOL, RT/LT Bilateral 3/7/1996 OD, 2001 OS     CHALAZION EXCISION  2014     H EYE PHAKIC IOL/ICL WITH YAG LASER IRIDOTOMY, BILATERAL Bilateral 1997 rt eye, 3/27/2003 lt eye     HYSTERECTOMY TOTAL ABDOMINAL, BILATERAL SALPINGO-OOPHORECTOMY, COMBINED      for heavy bleeding     KNEE SURGERY      laparoscopic     LAPAROSCOPIC CHOLECYSTECTOMY       LASER CO2 EXCISE VULVA WIDE LOCAL  2011    Procedure:LASER CO2 EXCISE VULVA WIDE LOCAL; Wide Local Excision of Vulva with CO2 Laser of Vulva , OREIRECTAL BIOPSY, COLPOSCOPY. *Latex Allergy*; Surgeon:ZAHRA CANCINO; Location: OR       Family history:  Family History   Problem Relation Age of Onset     Asthma Sister      Breast Cancer Mother 60     C.A.D. Mother      Diabetes Mother      Cerebrovascular Disease Father      C.A.D. Father      Diabetes Son      Cancer Son         testicular     Other Cancer Other         Yes     Anesthesia Reaction No family hx of      Colon Polyps No family hx of      Crohn's Disease No family hx of      Ulcerative Colitis No family hx of      Colon Cancer No family hx of        Medications:  No current outpatient medications on file.     Allergies:  The patientis allergic to adhesive tape, iodine-131, latex, shellfish allergy, sulfa antibiotics, and mold.    Social history:  Social History     Tobacco Use     Smoking status: Never     Smokeless tobacco: Never   Vaping Use     Vaping status: Not on file   Substance Use Topics     Alcohol  use: No     Marital status: .    Review of Systems:  There are no exam notes on file for this visit.     This procedure was performed under a collaborative agreement with Dr. Tom Mendez MD, Chief of Colon and Rectal Surgery, [facility].    Tamika Schmidt NP-C  Colon and Rectal Surgery  [facility]

## 2023-05-05 NOTE — NURSING NOTE
"Chief Complaint   Patient presents with     RECHECK     HRA       Vitals:    05/05/23 1010   BP: (!) 150/76   BP Location: Left arm   Patient Position: Sitting   Cuff Size: Adult Regular   Height: 4' 10\"       Body mass index is 27.59 kg/m .     Oral Alatorre, EMT- P    "

## 2023-05-05 NOTE — LETTER
2023       RE: Rakel Robert  [id number] [id number]       Dear Colleague,    Thank you for referring your patient, Rakel Robert, to the [facility] COLON AND RECTAL SURGERY CLINIC [place] at [facility]. Please see a copy of my visit note below.    Colon and Rectal Surgery Clinic High Resolution Anoscopy Note    RE: Rakel Robert  : 1945  KOSTAS: 23    Rakel Robert is a 76 year old female with a history of vulvovaginal dysplasia, for which she is no longer having active follow up. She has had prior HRA with biopsies showing low-grade dysplasia. Her last HRA was on 10/7/21 with biopsy showing AIN 1.    HPI: Rakel has no current complaints. She states that she is up to date on her colonoscopy.     ASSESSMENT:   Prior to the start of the procedure and with procedural staff participation, I verbally confirmed the patient s identity using two indicators, relevant allergies, that the procedure was appropriate and matched the consent or emergent situation, and that the correct equipment/implants were available. Immediately prior to starting the procedure I conducted the Time Out with the procedural staff and re-confirmed the patient s name, procedure, and site/side. (The Joint Commission universal protocol was followed.)  Yes    Sedation (Moderate or Deep): None    Anal cytology was obtained today using a Dacron swab. Digital anal rectal exam was performed with two small, movable lesions palpated in the right and left anal canal. Dilute acetic acid soak was completed for 2 minutes. Lubricant was used to insert the anoscope. Performed high resolution microscopy using the Proctostation. The dentate line was viewed in its entirety. No bright aceowhitening or punctation. Some internal hemorrhoids and some surface abrasions in the distal anal canal. Perianal area inspected after acetic acid soak with some  hyperpigmentation but no bright acetowhitening or punctation.  The patient tolerated the procedures well.    PLAN: No evidence of high grade dysplasia on exam today. Repeat high resolution anoscopy in one year. Patient's questions were answered to her stated satisfaction and she is in agreement with this plan.     For details of past medical history, surgical history, family history, medications, allergies, and review of systems, please see details below.    Medical history:  Past Medical History:   Diagnosis Date    Anal dysplasia 2012    Basal cell carcinoma     right elbow    History of cholecystectomy 2006    History of hysterectomy     12-15 years ago    Postmenopausal     Pregnancies, multiple         Sleep apnea        Surgical history:  Past Surgical History:   Procedure Laterality Date    CATARACT IOL, RT/LT Bilateral 3/7/1996 OD, 2001 OS    CHALAZION EXCISION  2014    H EYE PHAKIC IOL/ICL WITH YAG LASER IRIDOTOMY, BILATERAL Bilateral 1997 rt eye, 3/27/2003 lt eye    HYSTERECTOMY TOTAL ABDOMINAL, BILATERAL SALPINGO-OOPHORECTOMY, COMBINED      for heavy bleeding    KNEE SURGERY      laparoscopic    LAPAROSCOPIC CHOLECYSTECTOMY      LASER CO2 EXCISE VULVA WIDE LOCAL  2011    Procedure:LASER CO2 EXCISE VULVA WIDE LOCAL; Wide Local Excision of Vulva with CO2 Laser of Vulva , OREIRECTAL BIOPSY, COLPOSCOPY. *Latex Allergy*; Surgeon:ZAHRA CANCINO; Location: OR       Family history:  Family History   Problem Relation Age of Onset    Asthma Sister     Breast Cancer Mother 60    C.A.D. Mother     Diabetes Mother     Cerebrovascular Disease Father     C.A.D. Father     Diabetes Son     Cancer Son         testicular    Other Cancer Other         Yes    Anesthesia Reaction No family hx of     Colon Polyps No family hx of     Crohn's Disease No family hx of     Ulcerative Colitis No family hx of     Colon Cancer No family hx of        Medications:  No current outpatient  medications on file.     Allergies:  The patientis allergic to adhesive tape, iodine-131, latex, shellfish allergy, sulfa antibiotics, and mold.    Social history:  Social History     Tobacco Use    Smoking status: Never    Smokeless tobacco: Never   Vaping Use    Vaping status: Not on file   Substance Use Topics    Alcohol use: No     Marital status: .    Review of Systems:  There are no exam notes on file for this visit.     This procedure was performed under a collaborative agreement with Dr. Tom Mendez MD, Chief of Colon and Rectal Surgery, [facility].          Again, thank you for allowing me to participate in the care of your patient.      Sincerely,    VIC Leslie CNP

## 2023-05-08 LAB
PATH REPORT.COMMENTS IMP SPEC: NORMAL
PATH REPORT.FINAL DX SPEC: NORMAL
PATH REPORT.GROSS SPEC: NORMAL
PATH REPORT.RELEVANT HX SPEC: NORMAL

## 2023-06-02 ENCOUNTER — HEALTH MAINTENANCE LETTER (OUTPATIENT)
Age: 78
End: 2023-06-02

## 2024-02-21 ENCOUNTER — MYC MEDICAL ADVICE (OUTPATIENT)
Dept: SURGERY | Facility: CLINIC | Age: 79
End: 2024-02-21
Payer: COMMERCIAL

## 2024-04-21 ENCOUNTER — HEALTH MAINTENANCE LETTER (OUTPATIENT)
Age: 79
End: 2024-04-21

## 2024-05-14 ENCOUNTER — TELEPHONE (OUTPATIENT)
Dept: SURGERY | Facility: CLINIC | Age: 79
End: 2024-05-14
Payer: COMMERCIAL

## 2024-05-14 NOTE — TELEPHONE ENCOUNTER
Left Voicemail (1st Attempt), sent myc for the patient to call back and schedule the following:    Appointment type: Microscopy  Provider: Tamika Taylor  Return date: First available appt  Specialty phone number: [phone]  Additional appointment(s) needed: n/a  For / Appt Notes: HRA  Additonal Notes: per message from Alyssa Davis CMA    Left CC#

## 2024-06-30 ENCOUNTER — HEALTH MAINTENANCE LETTER (OUTPATIENT)
Age: 79
End: 2024-06-30

## 2024-07-25 RX ORDER — LIDOCAINE HYDROCHLORIDE 20 MG/ML
JELLY TOPICAL ONCE
Status: CANCELLED | OUTPATIENT
Start: 2024-07-25 | End: 2024-07-25

## 2024-07-25 RX ORDER — IODINE AND POTASSIUM IODIDE 50; 100 MG/ML; MG/ML
LIQUID ORAL ONCE
Status: CANCELLED | OUTPATIENT
Start: 2024-07-25 | End: 2024-07-25

## 2024-08-02 ENCOUNTER — OFFICE VISIT (OUTPATIENT)
Dept: SURGERY | Facility: CLINIC | Age: 79
End: 2024-08-02
Payer: COMMERCIAL

## 2024-08-02 VITALS — HEART RATE: 89 BPM | DIASTOLIC BLOOD PRESSURE: 70 MMHG | OXYGEN SATURATION: 98 % | SYSTOLIC BLOOD PRESSURE: 162 MMHG

## 2024-08-02 DIAGNOSIS — K62.82 ANAL DYSPLASIA: Primary | ICD-10-CM

## 2024-08-02 PROCEDURE — 88112 CYTOPATH CELL ENHANCE TECH: CPT | Mod: TC | Performed by: NURSE PRACTITIONER

## 2024-08-02 PROCEDURE — 88112 CYTOPATH CELL ENHANCE TECH: CPT | Mod: 26 | Performed by: PATHOLOGY

## 2024-08-02 PROCEDURE — 99212 OFFICE O/P EST SF 10 MIN: CPT | Performed by: NURSE PRACTITIONER

## 2024-08-02 PROCEDURE — 87624 HPV HI-RISK TYP POOLED RSLT: CPT | Performed by: NURSE PRACTITIONER

## 2024-08-02 ASSESSMENT — PAIN SCALES - GENERAL: PAINLEVEL: NO PAIN (0)

## 2024-08-02 NOTE — LETTER
2024       RE: Rakel Robert  [address] [facility] [address]     Dear Colleague,    Thank you for referring your patient, Rakel Robert, to the [facility] COLON AND RECTAL SURGERY CLINIC [place] at [facility]. Please see a copy of my visit note below.    Colon and Rectal Surgery Clinic Follow Up Note    RE: Rakel oRbert  : 1945  KOSTAS: 2024    Rakel Robert is a 78 year old female with a history of vulvovaginal dysplasia, for which she is no longer having active follow up. She has had prior HRA with biopsies showing low-grade dysplasia. Her last HRA was on 23 without high grade dysplasia and normal anal Pap with no HPV.    HPI: Rakel has no current complaints.     ASSESSMENT:   Anal cytology was obtained today using a Dacron swab.    PLAN: Will follow up with results of anal Pap and HPV genotyping from today for follow up plan. Patient's questions were answered to her stated satisfaction and she is in agreement with this plan.     For details of past medical history, surgical history, family history, medications, allergies, and review of systems, please see details below.    Medical history:  Past Medical History:   Diagnosis Date    Anal dysplasia 2012    Basal cell carcinoma     right elbow    History of cholecystectomy 2006    History of hysterectomy     12-15 years ago    Postmenopausal     Pregnancies, multiple         Sleep apnea        Surgical history:  Past Surgical History:   Procedure Laterality Date    CATARACT IOL, RT/LT Bilateral 3/7/1996 OD, 2001 OS    CHALAZION EXCISION  2014    H EYE PHAKIC IOL/ICL WITH YAG LASER IRIDOTOMY, BILATERAL Bilateral 1997 rt eye, 3/27/2003 lt eye    HYSTERECTOMY TOTAL ABDOMINAL, BILATERAL SALPINGO-OOPHORECTOMY, COMBINED      for heavy bleeding    KNEE SURGERY  2004    laparoscopic    LAPAROSCOPIC CHOLECYSTECTOMY  2006    LASER CO2 EXCISE  VULVA WIDE LOCAL  11/17/2011    Procedure:LASER CO2 EXCISE VULVA WIDE LOCAL; Wide Local Excision of Vulva with CO2 Laser of Vulva , OREIRECTAL BIOPSY, COLPOSCOPY. *Latex Allergy*; Surgeon:ZAHRA CANCINO; Location: OR       Family history:  Family History   Problem Relation Age of Onset    Asthma Sister     Breast Cancer Mother 60    C.A.D. Mother     Diabetes Mother     Cerebrovascular Disease Father     C.A.D. Father     Diabetes Son     Cancer Son         testicular    Other Cancer Other         Yes    Anesthesia Reaction No family hx of     Colon Polyps No family hx of     Crohn's Disease No family hx of     Ulcerative Colitis No family hx of     Colon Cancer No family hx of        Medications:  Current Outpatient Medications   Medication Sig Dispense Refill    losartan (COZAAR) 25 MG tablet Take 25 mg by mouth daily      moxifloxacin (VIGAMOX) 0.5 % ophthalmic solution 1 drop 3 times daily      RISEdronate (ACTONEL) 35 MG tablet Take by mouth every 7 days       Allergies:  The patientis allergic to adhesive tape, iodine-131, latex, shellfish allergy, sulfa antibiotics, and mold.    Social history:  Social History     Tobacco Use    Smoking status: Never    Smokeless tobacco: Never   Substance Use Topics    Alcohol use: No     Marital status: .    Review of Systems:  Nursing Notes:   Pritesh Armenta EMT  8/2/2024 10:05 AM  Signed  Chief Complaint   Patient presents with    Consult       Vitals:    08/02/24 0955   BP: (!) 162/70   BP Location: Left arm   Patient Position: Sitting   Cuff Size: Adult Regular   Pulse: 89   SpO2: 98%     There is no height or weight on file to calculate BMI.    Pritesh Armenta EMT-P     15 minutes spent on the date of encounter performing chart review, history and exam, documentation and further activities as noted above     Again, thank you for allowing me to participate in the care of your patient.      Sincerely,    Tamika Meyers, VIC CNP

## 2024-08-02 NOTE — NURSING NOTE
Chief Complaint   Patient presents with    Consult       Vitals:    08/02/24 0955   BP: (!) 162/70   BP Location: Left arm   Patient Position: Sitting   Cuff Size: Adult Regular   Pulse: 89   SpO2: 98%       There is no height or weight on file to calculate BMI.    Pritesh Armenta EMT-P

## 2024-08-02 NOTE — PROGRESS NOTES
Colon and Rectal Surgery Clinic Follow Up Note    RE: Rakel Robert  : 1945  KOSTAS: 2024    Rakel Robert is a 78 year old female with a history of vulvovaginal dysplasia, for which she is no longer having active follow up. She has had prior HRA with biopsies showing low-grade dysplasia. Her last HRA was on 23 without high grade dysplasia and normal anal Pap with no HPV.    HPI: Rakel has no current complaints.     ASSESSMENT:   Anal cytology was obtained today using a Dacron swab.    PLAN: Will follow up with results of anal Pap and HPV genotyping from today for follow up plan. Patient's questions were answered to her stated satisfaction and she is in agreement with this plan.     For details of past medical history, surgical history, family history, medications, allergies, and review of systems, please see details below.    Medical history:  Past Medical History:   Diagnosis Date    Anal dysplasia 2012    Basal cell carcinoma     right elbow    History of cholecystectomy 2006    History of hysterectomy     12-15 years ago    Postmenopausal     Pregnancies, multiple         Sleep apnea        Surgical history:  Past Surgical History:   Procedure Laterality Date    CATARACT IOL, RT/LT Bilateral 3/7/1996 OD, 2001 OS    CHALAZION EXCISION  2014    H EYE PHAKIC IOL/ICL WITH YAG LASER IRIDOTOMY, BILATERAL Bilateral 1997 rt eye, 3/27/2003 lt eye    HYSTERECTOMY TOTAL ABDOMINAL, BILATERAL SALPINGO-OOPHORECTOMY, COMBINED      for heavy bleeding    KNEE SURGERY  2004    laparoscopic    LAPAROSCOPIC CHOLECYSTECTOMY      LASER CO2 EXCISE VULVA WIDE LOCAL  2011    Procedure:LASER CO2 EXCISE VULVA WIDE LOCAL; Wide Local Excision of Vulva with CO2 Laser of Vulva , OREIRECTAL BIOPSY, COLPOSCOPY. *Latex Allergy*; Surgeon:ZAHRA CANCINO Location: OR       Family history:  Family History   Problem Relation Age of Onset    Asthma Sister     Breast Cancer Mother 60     C.A.DAVE. Mother     Diabetes Mother     Cerebrovascular Disease Father     C.A.D. Father     Diabetes Son     Cancer Son         testicular    Other Cancer Other         Yes    Anesthesia Reaction No family hx of     Colon Polyps No family hx of     Crohn's Disease No family hx of     Ulcerative Colitis No family hx of     Colon Cancer No family hx of        Medications:  Current Outpatient Medications   Medication Sig Dispense Refill    losartan (COZAAR) 25 MG tablet Take 25 mg by mouth daily      moxifloxacin (VIGAMOX) 0.5 % ophthalmic solution 1 drop 3 times daily      RISEdronate (ACTONEL) 35 MG tablet Take by mouth every 7 days       Allergies:  The patientis allergic to adhesive tape, iodine-131, latex, shellfish allergy, sulfa antibiotics, and mold.    Social history:  Social History     Tobacco Use    Smoking status: Never    Smokeless tobacco: Never   Substance Use Topics    Alcohol use: No     Marital status: .    Review of Systems:  Nursing Notes:   Pritesh Armenta EMT  8/2/2024 10:05 AM  Signed  Chief Complaint   Patient presents with    Consult       Vitals:    08/02/24 0955   BP: (!) 162/70   BP Location: Left arm   Patient Position: Sitting   Cuff Size: Adult Regular   Pulse: 89   SpO2: 98%       There is no height or weight on file to calculate BMI.    Pritesh Armenta EMT-P     15 minutes spent on the date of encounter performing chart review, history and exam, documentation and further activities as noted above     Tamika Schmidt NP-C  Colon and Rectal Surgery  [facility]

## 2024-08-05 LAB
PATH REPORT.COMMENTS IMP SPEC: NORMAL
PATH REPORT.FINAL DX SPEC: NORMAL
PATH REPORT.GROSS SPEC: NORMAL
PATH REPORT.MICROSCOPIC SPEC OTHER STN: NORMAL
PATH REPORT.RELEVANT HX SPEC: NORMAL

## 2025-05-12 ENCOUNTER — MYC MEDICAL ADVICE (OUTPATIENT)
Dept: SURGERY | Facility: CLINIC | Age: 80
End: 2025-05-12
Payer: COMMERCIAL

## 2025-06-09 ENCOUNTER — TELEPHONE (OUTPATIENT)
Dept: SURGERY | Facility: CLINIC | Age: 80
End: 2025-06-09
Payer: COMMERCIAL

## 2025-06-09 NOTE — TELEPHONE ENCOUNTER
This writer spoke with patient, who had left a voicemail message on the Colon and Rectal Surgery Scheduling line.     This writer explained to patient that this writer does not see any current orders for her.    Patient states that she got a text message telling her that she's due for her Clinic Microscopy appointment in August, 2025, and that the following return call number was included in her text message: [phone].    This writer transferred the call to the above number for the Clinic Coordinators.    Evelyn elaine on 6/9/2025 at 11:30 AM

## 2025-07-13 ENCOUNTER — HEALTH MAINTENANCE LETTER (OUTPATIENT)
Age: 80
End: 2025-07-13

## 2025-08-03 ENCOUNTER — HEALTH MAINTENANCE LETTER (OUTPATIENT)
Age: 80
End: 2025-08-03

## 2025-08-15 PROCEDURE — 88112 CYTOPATH CELL ENHANCE TECH: CPT | Mod: TC | Performed by: NURSE PRACTITIONER

## 2025-08-15 PROCEDURE — 88112 CYTOPATH CELL ENHANCE TECH: CPT | Mod: 26 | Performed by: PATHOLOGY

## 2025-08-15 PROCEDURE — 87624 HPV HI-RISK TYP POOLED RSLT: CPT | Performed by: NURSE PRACTITIONER

## (undated) RX ORDER — LIDOCAINE HYDROCHLORIDE AND EPINEPHRINE 10; 10 MG/ML; UG/ML
INJECTION, SOLUTION INFILTRATION; PERINEURAL
Status: DISPENSED
Start: 2021-10-07

## (undated) RX ORDER — LIDOCAINE HYDROCHLORIDE AND EPINEPHRINE 10; 10 MG/ML; UG/ML
INJECTION, SOLUTION INFILTRATION; PERINEURAL
Status: DISPENSED
Start: 2017-11-15

## (undated) RX ORDER — FERRIC SUBSULFATE 0.21 G/G
LIQUID TOPICAL
Status: DISPENSED
Start: 2018-12-13

## (undated) RX ORDER — FERRIC SUBSULFATE 0.21 G/G
LIQUID TOPICAL
Status: DISPENSED
Start: 2017-11-15

## (undated) RX ORDER — LIDOCAINE HYDROCHLORIDE 20 MG/ML
JELLY TOPICAL
Status: DISPENSED
Start: 2024-08-02

## (undated) RX ORDER — FERRIC SUBSULFATE 0.21 G/G
LIQUID TOPICAL
Status: DISPENSED
Start: 2018-06-28

## (undated) RX ORDER — ACETIC ACID 5 %
LIQUID (ML) MISCELLANEOUS
Status: DISPENSED
Start: 2018-12-13

## (undated) RX ORDER — ACETIC ACID 5 %
LIQUID (ML) MISCELLANEOUS
Status: DISPENSED
Start: 2023-05-05

## (undated) RX ORDER — FERRIC SUBSULFATE 0.21 G/G
LIQUID TOPICAL
Status: DISPENSED
Start: 2021-10-07

## (undated) RX ORDER — ACETIC ACID 5 %
LIQUID (ML) MISCELLANEOUS
Status: DISPENSED
Start: 2024-08-02

## (undated) RX ORDER — FERRIC SUBSULFATE 0.21 G/G
LIQUID TOPICAL
Status: DISPENSED
Start: 2024-08-02

## (undated) RX ORDER — FERRIC SUBSULFATE 0.21 G/G
LIQUID TOPICAL
Status: DISPENSED
Start: 2020-10-08

## (undated) RX ORDER — ACETIC ACID 5 %
LIQUID (ML) MISCELLANEOUS
Status: DISPENSED
Start: 2019-07-25

## (undated) RX ORDER — ACETIC ACID 5 %
LIQUID (ML) MISCELLANEOUS
Status: DISPENSED
Start: 2018-06-28

## (undated) RX ORDER — LIDOCAINE HYDROCHLORIDE AND EPINEPHRINE 10; 10 MG/ML; UG/ML
INJECTION, SOLUTION INFILTRATION; PERINEURAL
Status: DISPENSED
Start: 2020-10-08

## (undated) RX ORDER — ACETIC ACID 5 %
LIQUID (ML) MISCELLANEOUS
Status: DISPENSED
Start: 2017-11-15

## (undated) RX ORDER — ACETIC ACID 5 %
LIQUID (ML) MISCELLANEOUS
Status: DISPENSED
Start: 2021-10-07

## (undated) RX ORDER — LIDOCAINE HYDROCHLORIDE 20 MG/ML
JELLY TOPICAL
Status: DISPENSED
Start: 2023-05-05

## (undated) RX ORDER — LIDOCAINE HYDROCHLORIDE AND EPINEPHRINE 10; 10 MG/ML; UG/ML
INJECTION, SOLUTION INFILTRATION; PERINEURAL
Status: DISPENSED
Start: 2018-12-13

## (undated) RX ORDER — LIDOCAINE HYDROCHLORIDE AND EPINEPHRINE 10; 10 MG/ML; UG/ML
INJECTION, SOLUTION INFILTRATION; PERINEURAL
Status: DISPENSED
Start: 2019-07-25

## (undated) RX ORDER — LIDOCAINE HYDROCHLORIDE AND EPINEPHRINE 10; 10 MG/ML; UG/ML
INJECTION, SOLUTION INFILTRATION; PERINEURAL
Status: DISPENSED
Start: 2018-06-28

## (undated) RX ORDER — ACETIC ACID 5 %
LIQUID (ML) MISCELLANEOUS
Status: DISPENSED
Start: 2020-10-08

## (undated) RX ORDER — FERRIC SUBSULFATE 0.21 G/G
LIQUID TOPICAL
Status: DISPENSED
Start: 2019-07-25